# Patient Record
Sex: MALE | Race: WHITE | Employment: OTHER | URBAN - METROPOLITAN AREA
[De-identification: names, ages, dates, MRNs, and addresses within clinical notes are randomized per-mention and may not be internally consistent; named-entity substitution may affect disease eponyms.]

---

## 2017-01-11 ENCOUNTER — HOSPITAL ENCOUNTER (OUTPATIENT)
Dept: GENERAL RADIOLOGY | Age: 82
Discharge: HOME | End: 2017-01-11
Attending: INTERNAL MEDICINE | Admitting: INTERNAL MEDICINE

## 2017-01-11 DIAGNOSIS — I10 ESSENTIAL HYPERTENSION: ICD-10-CM

## 2017-01-11 DIAGNOSIS — E78.5 HYPERLIPIDEMIA, UNSPECIFIED HYPERLIPIDEMIA TYPE: ICD-10-CM

## 2017-01-11 LAB
A/G RATIO: 1.6 (ref 1.1–2.2)
ALBUMIN SERPL-MCNC: 4 G/DL (ref 3.4–5)
ALP BLD-CCNC: 43 U/L (ref 40–129)
ALT SERPL-CCNC: 17 U/L (ref 10–40)
ANION GAP SERPL CALCULATED.3IONS-SCNC: 13 MMOL/L (ref 3–16)
AST SERPL-CCNC: 16 U/L (ref 15–37)
BILIRUB SERPL-MCNC: 0.6 MG/DL (ref 0–1)
BUN BLDV-MCNC: 34 MG/DL (ref 7–20)
CALCIUM SERPL-MCNC: 9.2 MG/DL (ref 8.3–10.6)
CHLORIDE BLD-SCNC: 102 MMOL/L (ref 99–110)
CHOLESTEROL, TOTAL: 149 MG/DL (ref 0–199)
CO2: 29 MMOL/L (ref 21–32)
CREAT SERPL-MCNC: 1.4 MG/DL (ref 0.8–1.3)
GFR AFRICAN AMERICAN: 59
GFR NON-AFRICAN AMERICAN: 49
GLOBULIN: 2.5 G/DL
GLUCOSE BLD-MCNC: 89 MG/DL (ref 70–99)
HCT VFR BLD CALC: 42.7 % (ref 40.5–52.5)
HDLC SERPL-MCNC: 56 MG/DL (ref 40–60)
HEMATOLOGY PATH CONSULT: NO
HEMOGLOBIN: 12.5 G/DL (ref 13.5–17.5)
LDL CHOLESTEROL CALCULATED: 59 MG/DL
MCH RBC QN AUTO: 18.6 PG (ref 26–34)
MCHC RBC AUTO-ENTMCNC: 29.3 G/DL (ref 31–36)
MCV RBC AUTO: 63.4 FL (ref 80–100)
PDW BLD-RTO: 19.1 % (ref 12.4–15.4)
PLATELET # BLD: 353 K/UL (ref 135–450)
PMV BLD AUTO: 9.1 FL (ref 5–10.5)
POTASSIUM SERPL-SCNC: 4.5 MMOL/L (ref 3.5–5.1)
RBC # BLD: 6.74 M/UL (ref 4.2–5.9)
SODIUM BLD-SCNC: 144 MMOL/L (ref 136–145)
TOTAL PROTEIN: 6.5 G/DL (ref 6.4–8.2)
TRIGL SERPL-MCNC: 171 MG/DL (ref 0–150)
VLDLC SERPL CALC-MCNC: 34 MG/DL
WBC # BLD: 11.3 K/UL (ref 4–11)

## 2017-01-12 ENCOUNTER — OFFICE VISIT (OUTPATIENT)
Dept: INTERNAL MEDICINE CLINIC | Age: 82
End: 2017-01-12

## 2017-01-12 VITALS
OXYGEN SATURATION: 95 % | BODY MASS INDEX: 23.92 KG/M2 | WEIGHT: 162 LBS | DIASTOLIC BLOOD PRESSURE: 50 MMHG | SYSTOLIC BLOOD PRESSURE: 126 MMHG | HEART RATE: 93 BPM

## 2017-01-12 DIAGNOSIS — N18.30 CHRONIC RENAL INSUFFICIENCY, STAGE 3 (MODERATE) (HCC): ICD-10-CM

## 2017-01-12 DIAGNOSIS — E78.5 HYPERLIPIDEMIA, UNSPECIFIED HYPERLIPIDEMIA TYPE: ICD-10-CM

## 2017-01-12 DIAGNOSIS — I25.10 CAD IN NATIVE ARTERY: ICD-10-CM

## 2017-01-12 DIAGNOSIS — I27.20 PULMONARY HYPERTENSION (HCC): ICD-10-CM

## 2017-01-12 DIAGNOSIS — C34.11 MALIGNANT NEOPLASM OF UPPER LOBE OF RIGHT LUNG (HCC): ICD-10-CM

## 2017-01-12 DIAGNOSIS — I10 ESSENTIAL HYPERTENSION: Primary | ICD-10-CM

## 2017-01-12 DIAGNOSIS — J06.9 UPPER RESPIRATORY TRACT INFECTION, UNSPECIFIED TYPE: ICD-10-CM

## 2017-01-12 PROCEDURE — 4040F PNEUMOC VAC/ADMIN/RCVD: CPT | Performed by: INTERNAL MEDICINE

## 2017-01-12 PROCEDURE — 1036F TOBACCO NON-USER: CPT | Performed by: INTERNAL MEDICINE

## 2017-01-12 PROCEDURE — G8484 FLU IMMUNIZE NO ADMIN: HCPCS | Performed by: INTERNAL MEDICINE

## 2017-01-12 PROCEDURE — 1123F ACP DISCUSS/DSCN MKR DOCD: CPT | Performed by: INTERNAL MEDICINE

## 2017-01-12 PROCEDURE — G8420 CALC BMI NORM PARAMETERS: HCPCS | Performed by: INTERNAL MEDICINE

## 2017-01-12 PROCEDURE — 99214 OFFICE O/P EST MOD 30 MIN: CPT | Performed by: INTERNAL MEDICINE

## 2017-01-12 PROCEDURE — G8598 ASA/ANTIPLAT THER USED: HCPCS | Performed by: INTERNAL MEDICINE

## 2017-01-12 PROCEDURE — G8427 DOCREV CUR MEDS BY ELIG CLIN: HCPCS | Performed by: INTERNAL MEDICINE

## 2017-01-12 ASSESSMENT — PATIENT HEALTH QUESTIONNAIRE - PHQ9
1. LITTLE INTEREST OR PLEASURE IN DOING THINGS: 1
SUM OF ALL RESPONSES TO PHQ9 QUESTIONS 1 & 2: 2
SUM OF ALL RESPONSES TO PHQ9 QUESTIONS 1 & 2: 0
1. LITTLE INTEREST OR PLEASURE IN DOING THINGS: 0
SUM OF ALL RESPONSES TO PHQ QUESTIONS 1-9: 0
2. FEELING DOWN, DEPRESSED OR HOPELESS: 1
SUM OF ALL RESPONSES TO PHQ QUESTIONS 1-9: 2
2. FEELING DOWN, DEPRESSED OR HOPELESS: 0

## 2017-01-26 RX ORDER — ZOLPIDEM TARTRATE 10 MG/1
TABLET ORAL
Qty: 30 TABLET | Refills: 2 | Status: SHIPPED | OUTPATIENT
Start: 2017-01-26 | End: 2017-06-02 | Stop reason: SDUPTHER

## 2017-01-26 RX ORDER — SERTRALINE HYDROCHLORIDE 100 MG/1
TABLET, FILM COATED ORAL
Qty: 90 TABLET | OUTPATIENT
Start: 2017-01-26

## 2017-03-27 ENCOUNTER — TELEPHONE (OUTPATIENT)
Dept: INTERNAL MEDICINE CLINIC | Age: 82
End: 2017-03-27

## 2017-03-27 RX ORDER — SERTRALINE HYDROCHLORIDE 100 MG/1
100 TABLET, FILM COATED ORAL DAILY
Qty: 90 TABLET | Refills: 3 | Status: SHIPPED | OUTPATIENT
Start: 2017-03-27 | End: 2017-04-13 | Stop reason: SDUPTHER

## 2017-04-13 ENCOUNTER — TELEPHONE (OUTPATIENT)
Dept: INTERNAL MEDICINE CLINIC | Age: 82
End: 2017-04-13

## 2017-04-13 ENCOUNTER — OFFICE VISIT (OUTPATIENT)
Dept: INTERNAL MEDICINE CLINIC | Age: 82
End: 2017-04-13

## 2017-04-13 ENCOUNTER — HOSPITAL ENCOUNTER (OUTPATIENT)
Dept: VASCULAR LAB | Age: 82
Discharge: HOME | End: 2017-04-13
Attending: INTERNAL MEDICINE | Admitting: INTERNAL MEDICINE

## 2017-04-13 VITALS
WEIGHT: 161 LBS | HEIGHT: 70 IN | BODY MASS INDEX: 23.05 KG/M2 | SYSTOLIC BLOOD PRESSURE: 122 MMHG | DIASTOLIC BLOOD PRESSURE: 64 MMHG | OXYGEN SATURATION: 86 % | HEART RATE: 93 BPM

## 2017-04-13 DIAGNOSIS — I10 ESSENTIAL HYPERTENSION: ICD-10-CM

## 2017-04-13 DIAGNOSIS — M79.604 PAIN OF RIGHT LOWER EXTREMITY: Primary | ICD-10-CM

## 2017-04-13 DIAGNOSIS — F32.A DEPRESSION, UNSPECIFIED DEPRESSION TYPE: ICD-10-CM

## 2017-04-13 DIAGNOSIS — M79.604 PAIN OF RIGHT LEG: ICD-10-CM

## 2017-04-13 DIAGNOSIS — N18.30 CHRONIC RENAL INSUFFICIENCY, STAGE 3 (MODERATE) (HCC): ICD-10-CM

## 2017-04-13 DIAGNOSIS — I25.10 CORONARY ARTERY DISEASE INVOLVING NATIVE CORONARY ARTERY OF NATIVE HEART WITHOUT ANGINA PECTORIS: ICD-10-CM

## 2017-04-13 PROCEDURE — 1123F ACP DISCUSS/DSCN MKR DOCD: CPT | Performed by: INTERNAL MEDICINE

## 2017-04-13 PROCEDURE — G8420 CALC BMI NORM PARAMETERS: HCPCS | Performed by: INTERNAL MEDICINE

## 2017-04-13 PROCEDURE — 1036F TOBACCO NON-USER: CPT | Performed by: INTERNAL MEDICINE

## 2017-04-13 PROCEDURE — 4040F PNEUMOC VAC/ADMIN/RCVD: CPT | Performed by: INTERNAL MEDICINE

## 2017-04-13 PROCEDURE — G8427 DOCREV CUR MEDS BY ELIG CLIN: HCPCS | Performed by: INTERNAL MEDICINE

## 2017-04-13 PROCEDURE — G8598 ASA/ANTIPLAT THER USED: HCPCS | Performed by: INTERNAL MEDICINE

## 2017-04-13 PROCEDURE — 99214 OFFICE O/P EST MOD 30 MIN: CPT | Performed by: INTERNAL MEDICINE

## 2017-04-13 RX ORDER — SERTRALINE HYDROCHLORIDE 100 MG/1
150 TABLET, FILM COATED ORAL DAILY
Qty: 135 TABLET | Refills: 3 | Status: SHIPPED | OUTPATIENT
Start: 2017-04-13 | End: 2018-01-30 | Stop reason: SDUPTHER

## 2017-04-17 ENCOUNTER — TELEPHONE (OUTPATIENT)
Dept: INTERNAL MEDICINE CLINIC | Age: 82
End: 2017-04-17

## 2017-04-18 ENCOUNTER — TELEPHONE (OUTPATIENT)
Dept: INTERNAL MEDICINE CLINIC | Age: 82
End: 2017-04-18

## 2017-04-29 DIAGNOSIS — I82.431 ACUTE DEEP VEIN THROMBOSIS (DVT) OF POPLITEAL VEIN OF RIGHT LOWER EXTREMITY (HCC): ICD-10-CM

## 2017-04-29 PROBLEM — I82.409 DVT (DEEP VENOUS THROMBOSIS) (HCC): Status: ACTIVE | Noted: 2017-04-13

## 2017-05-01 ENCOUNTER — OFFICE VISIT (OUTPATIENT)
Dept: INTERNAL MEDICINE CLINIC | Age: 82
End: 2017-05-01

## 2017-05-01 VITALS
TEMPERATURE: 99 F | HEART RATE: 78 BPM | OXYGEN SATURATION: 86 % | SYSTOLIC BLOOD PRESSURE: 140 MMHG | DIASTOLIC BLOOD PRESSURE: 70 MMHG | BODY MASS INDEX: 23.27 KG/M2 | WEIGHT: 161 LBS

## 2017-05-01 DIAGNOSIS — J01.00 ACUTE NON-RECURRENT MAXILLARY SINUSITIS: Primary | ICD-10-CM

## 2017-05-01 DIAGNOSIS — R05.9 COUGH: ICD-10-CM

## 2017-05-01 PROCEDURE — G8598 ASA/ANTIPLAT THER USED: HCPCS | Performed by: NURSE PRACTITIONER

## 2017-05-01 PROCEDURE — 1123F ACP DISCUSS/DSCN MKR DOCD: CPT | Performed by: NURSE PRACTITIONER

## 2017-05-01 PROCEDURE — 4040F PNEUMOC VAC/ADMIN/RCVD: CPT | Performed by: NURSE PRACTITIONER

## 2017-05-01 PROCEDURE — G8420 CALC BMI NORM PARAMETERS: HCPCS | Performed by: NURSE PRACTITIONER

## 2017-05-01 PROCEDURE — 1036F TOBACCO NON-USER: CPT | Performed by: NURSE PRACTITIONER

## 2017-05-01 PROCEDURE — G8427 DOCREV CUR MEDS BY ELIG CLIN: HCPCS | Performed by: NURSE PRACTITIONER

## 2017-05-01 PROCEDURE — 99213 OFFICE O/P EST LOW 20 MIN: CPT | Performed by: NURSE PRACTITIONER

## 2017-05-01 RX ORDER — AZITHROMYCIN 250 MG/1
TABLET, FILM COATED ORAL
Qty: 1 PACKET | Refills: 0 | Status: SHIPPED | OUTPATIENT
Start: 2017-05-01 | End: 2017-08-14

## 2017-05-01 ASSESSMENT — ENCOUNTER SYMPTOMS
DIARRHEA: 0
SINUS PRESSURE: 1
COUGH: 1
FACIAL SWELLING: 0
NAUSEA: 0
SORE THROAT: 0
VOMITING: 0

## 2017-05-09 ENCOUNTER — TELEPHONE (OUTPATIENT)
Dept: INTERNAL MEDICINE CLINIC | Age: 82
End: 2017-05-09

## 2017-05-09 RX ORDER — BENZONATATE 100 MG/1
100 CAPSULE ORAL 3 TIMES DAILY PRN
Qty: 15 CAPSULE | Refills: 0 | Status: SHIPPED | OUTPATIENT
Start: 2017-05-09 | End: 2017-11-10 | Stop reason: ALTCHOICE

## 2017-05-09 RX ORDER — FLUTICASONE PROPIONATE 50 MCG
SPRAY, SUSPENSION (ML) NASAL
Qty: 1 BOTTLE | Refills: 1 | Status: SHIPPED | OUTPATIENT
Start: 2017-05-09

## 2017-05-11 ENCOUNTER — OFFICE VISIT (OUTPATIENT)
Dept: INTERNAL MEDICINE CLINIC | Age: 82
End: 2017-05-11

## 2017-05-11 VITALS
WEIGHT: 158 LBS | DIASTOLIC BLOOD PRESSURE: 60 MMHG | OXYGEN SATURATION: 90 % | HEART RATE: 90 BPM | SYSTOLIC BLOOD PRESSURE: 129 MMHG | BODY MASS INDEX: 22.62 KG/M2 | HEIGHT: 70 IN

## 2017-05-11 DIAGNOSIS — I10 ESSENTIAL HYPERTENSION: ICD-10-CM

## 2017-05-11 DIAGNOSIS — I25.10 CORONARY ARTERY DISEASE INVOLVING NATIVE CORONARY ARTERY OF NATIVE HEART WITHOUT ANGINA PECTORIS: ICD-10-CM

## 2017-05-11 DIAGNOSIS — I27.20 PULMONARY HYPERTENSION (HCC): ICD-10-CM

## 2017-05-11 DIAGNOSIS — I63.331 CEREBROVASCULAR ACCIDENT (CVA) DUE TO THROMBOSIS OF RIGHT POSTERIOR CEREBRAL ARTERY (HCC): ICD-10-CM

## 2017-05-11 DIAGNOSIS — I21.3 ST ELEVATION MYOCARDIAL INFARCTION (STEMI), UNSPECIFIED ARTERY (HCC): ICD-10-CM

## 2017-05-11 DIAGNOSIS — D64.9 ANEMIA, UNSPECIFIED TYPE: ICD-10-CM

## 2017-05-11 DIAGNOSIS — E78.5 HYPERLIPIDEMIA, UNSPECIFIED HYPERLIPIDEMIA TYPE: ICD-10-CM

## 2017-05-11 DIAGNOSIS — I82.431 ACUTE DEEP VEIN THROMBOSIS (DVT) OF POPLITEAL VEIN OF RIGHT LOWER EXTREMITY (HCC): Primary | ICD-10-CM

## 2017-05-11 PROCEDURE — 1123F ACP DISCUSS/DSCN MKR DOCD: CPT | Performed by: INTERNAL MEDICINE

## 2017-05-11 PROCEDURE — 1036F TOBACCO NON-USER: CPT | Performed by: INTERNAL MEDICINE

## 2017-05-11 PROCEDURE — 99214 OFFICE O/P EST MOD 30 MIN: CPT | Performed by: INTERNAL MEDICINE

## 2017-05-11 PROCEDURE — G8419 CALC BMI OUT NRM PARAM NOF/U: HCPCS | Performed by: INTERNAL MEDICINE

## 2017-05-11 PROCEDURE — G8427 DOCREV CUR MEDS BY ELIG CLIN: HCPCS | Performed by: INTERNAL MEDICINE

## 2017-05-11 PROCEDURE — 4040F PNEUMOC VAC/ADMIN/RCVD: CPT | Performed by: INTERNAL MEDICINE

## 2017-05-11 PROCEDURE — G8598 ASA/ANTIPLAT THER USED: HCPCS | Performed by: INTERNAL MEDICINE

## 2017-05-15 PROBLEM — I82.401 DEEP VEIN THROMBOSIS OF RIGHT LOWER LIMB (HCC): Status: ACTIVE | Noted: 2017-05-15

## 2017-06-02 RX ORDER — ZOLPIDEM TARTRATE 10 MG/1
TABLET ORAL
Qty: 30 TABLET | Refills: 2 | Status: SHIPPED | OUTPATIENT
Start: 2017-06-02 | End: 2018-01-16 | Stop reason: SDUPTHER

## 2017-06-08 ENCOUNTER — TELEPHONE (OUTPATIENT)
Dept: INTERNAL MEDICINE CLINIC | Age: 82
End: 2017-06-08

## 2017-08-17 ENCOUNTER — OFFICE VISIT (OUTPATIENT)
Dept: INTERNAL MEDICINE CLINIC | Age: 82
End: 2017-08-17

## 2017-08-17 VITALS
HEIGHT: 70 IN | BODY MASS INDEX: 22.9 KG/M2 | SYSTOLIC BLOOD PRESSURE: 120 MMHG | DIASTOLIC BLOOD PRESSURE: 54 MMHG | WEIGHT: 160 LBS | OXYGEN SATURATION: 91 % | HEART RATE: 82 BPM

## 2017-08-17 DIAGNOSIS — E78.5 HYPERLIPIDEMIA, UNSPECIFIED HYPERLIPIDEMIA TYPE: ICD-10-CM

## 2017-08-17 DIAGNOSIS — I63.331 CEREBROVASCULAR ACCIDENT (CVA) DUE TO THROMBOSIS OF RIGHT POSTERIOR CEREBRAL ARTERY (HCC): ICD-10-CM

## 2017-08-17 DIAGNOSIS — I10 ESSENTIAL HYPERTENSION: ICD-10-CM

## 2017-08-17 DIAGNOSIS — I25.10 CAD IN NATIVE ARTERY: ICD-10-CM

## 2017-08-17 DIAGNOSIS — L03.115 CELLULITIS OF RIGHT LOWER EXTREMITY: Primary | ICD-10-CM

## 2017-08-17 PROCEDURE — 4040F PNEUMOC VAC/ADMIN/RCVD: CPT | Performed by: INTERNAL MEDICINE

## 2017-08-17 PROCEDURE — G8598 ASA/ANTIPLAT THER USED: HCPCS | Performed by: INTERNAL MEDICINE

## 2017-08-17 PROCEDURE — G8427 DOCREV CUR MEDS BY ELIG CLIN: HCPCS | Performed by: INTERNAL MEDICINE

## 2017-08-17 PROCEDURE — 1036F TOBACCO NON-USER: CPT | Performed by: INTERNAL MEDICINE

## 2017-08-17 PROCEDURE — 99214 OFFICE O/P EST MOD 30 MIN: CPT | Performed by: INTERNAL MEDICINE

## 2017-08-17 PROCEDURE — G8420 CALC BMI NORM PARAMETERS: HCPCS | Performed by: INTERNAL MEDICINE

## 2017-08-17 PROCEDURE — 1123F ACP DISCUSS/DSCN MKR DOCD: CPT | Performed by: INTERNAL MEDICINE

## 2017-09-05 ENCOUNTER — TELEPHONE (OUTPATIENT)
Dept: INTERNAL MEDICINE CLINIC | Age: 82
End: 2017-09-05

## 2017-09-05 RX ORDER — CLINDAMYCIN HYDROCHLORIDE 300 MG/1
300 CAPSULE ORAL 3 TIMES DAILY
Qty: 30 CAPSULE | Refills: 0 | Status: SHIPPED | OUTPATIENT
Start: 2017-09-05 | End: 2017-09-15

## 2017-09-08 ENCOUNTER — OFFICE VISIT (OUTPATIENT)
Dept: INTERNAL MEDICINE CLINIC | Age: 82
End: 2017-09-08

## 2017-09-08 VITALS
BODY MASS INDEX: 22.76 KG/M2 | WEIGHT: 159 LBS | HEART RATE: 79 BPM | HEIGHT: 70 IN | DIASTOLIC BLOOD PRESSURE: 58 MMHG | OXYGEN SATURATION: 94 % | SYSTOLIC BLOOD PRESSURE: 114 MMHG

## 2017-09-08 DIAGNOSIS — Z01.818 PRE-OP EXAMINATION: Primary | ICD-10-CM

## 2017-09-08 DIAGNOSIS — M48.00 SPINAL STENOSIS, UNSPECIFIED SPINAL REGION: ICD-10-CM

## 2017-09-08 DIAGNOSIS — I63.331 CEREBROVASCULAR ACCIDENT (CVA) DUE TO THROMBOSIS OF RIGHT POSTERIOR CEREBRAL ARTERY (HCC): ICD-10-CM

## 2017-09-08 DIAGNOSIS — C34.11 MALIGNANT NEOPLASM OF UPPER LOBE OF RIGHT LUNG (HCC): ICD-10-CM

## 2017-09-08 DIAGNOSIS — E78.5 HYPERLIPIDEMIA, UNSPECIFIED HYPERLIPIDEMIA TYPE: ICD-10-CM

## 2017-09-08 DIAGNOSIS — I21.3 ST ELEVATION MYOCARDIAL INFARCTION (STEMI), UNSPECIFIED ARTERY (HCC): ICD-10-CM

## 2017-09-08 DIAGNOSIS — I10 ESSENTIAL HYPERTENSION: ICD-10-CM

## 2017-09-08 DIAGNOSIS — I27.20 PULMONARY HYPERTENSION (HCC): ICD-10-CM

## 2017-09-08 PROCEDURE — 1036F TOBACCO NON-USER: CPT | Performed by: INTERNAL MEDICINE

## 2017-09-08 PROCEDURE — 93000 ELECTROCARDIOGRAM COMPLETE: CPT | Performed by: INTERNAL MEDICINE

## 2017-09-08 PROCEDURE — 99215 OFFICE O/P EST HI 40 MIN: CPT | Performed by: INTERNAL MEDICINE

## 2017-09-08 PROCEDURE — 1123F ACP DISCUSS/DSCN MKR DOCD: CPT | Performed by: INTERNAL MEDICINE

## 2017-09-08 PROCEDURE — 4040F PNEUMOC VAC/ADMIN/RCVD: CPT | Performed by: INTERNAL MEDICINE

## 2017-09-08 PROCEDURE — G8598 ASA/ANTIPLAT THER USED: HCPCS | Performed by: INTERNAL MEDICINE

## 2017-09-08 PROCEDURE — G8420 CALC BMI NORM PARAMETERS: HCPCS | Performed by: INTERNAL MEDICINE

## 2017-09-08 PROCEDURE — G8427 DOCREV CUR MEDS BY ELIG CLIN: HCPCS | Performed by: INTERNAL MEDICINE

## 2017-09-19 ENCOUNTER — HOSPITAL ENCOUNTER (OUTPATIENT)
Dept: SURGERY | Age: 82
Discharge: HOME | End: 2017-09-19
Attending: OPHTHALMOLOGY | Admitting: OPHTHALMOLOGY

## 2017-09-19 VITALS
DIASTOLIC BLOOD PRESSURE: 65 MMHG | TEMPERATURE: 97.7 F | HEIGHT: 70 IN | OXYGEN SATURATION: 97 % | RESPIRATION RATE: 13 BRPM | HEART RATE: 66 BPM | BODY MASS INDEX: 22.76 KG/M2 | SYSTOLIC BLOOD PRESSURE: 106 MMHG | WEIGHT: 159 LBS

## 2017-09-19 RX ORDER — PROMETHAZINE HYDROCHLORIDE 25 MG/ML
6.25 INJECTION, SOLUTION INTRAMUSCULAR; INTRAVENOUS
Status: DISCONTINUED | OUTPATIENT
Start: 2017-09-19 | End: 2017-09-20 | Stop reason: HOSPADM

## 2017-09-19 RX ORDER — DIPHENHYDRAMINE HCL 50 MG
50 CAPSULE ORAL EVERY 6 HOURS PRN
Status: ON HOLD | COMMUNITY
End: 2018-01-01 | Stop reason: HOSPADM

## 2017-09-19 RX ORDER — MORPHINE SULFATE 2 MG/ML
1 INJECTION, SOLUTION INTRAMUSCULAR; INTRAVENOUS EVERY 5 MIN PRN
Status: DISCONTINUED | OUTPATIENT
Start: 2017-09-19 | End: 2017-09-20 | Stop reason: HOSPADM

## 2017-09-19 RX ORDER — LIDOCAINE HYDROCHLORIDE 10 MG/ML
1 INJECTION, SOLUTION EPIDURAL; INFILTRATION; INTRACAUDAL; PERINEURAL
Status: ACTIVE | OUTPATIENT
Start: 2017-09-19 | End: 2017-09-19

## 2017-09-19 RX ORDER — DIPHENHYDRAMINE HYDROCHLORIDE 50 MG/ML
12.5 INJECTION INTRAMUSCULAR; INTRAVENOUS
Status: ACTIVE | OUTPATIENT
Start: 2017-09-19 | End: 2017-09-19

## 2017-09-19 RX ORDER — OXYCODONE HYDROCHLORIDE AND ACETAMINOPHEN 5; 325 MG/1; MG/1
1 TABLET ORAL PRN
Status: ACTIVE | OUTPATIENT
Start: 2017-09-19 | End: 2017-09-19

## 2017-09-19 RX ORDER — OXYCODONE HYDROCHLORIDE AND ACETAMINOPHEN 5; 325 MG/1; MG/1
2 TABLET ORAL PRN
Status: ACTIVE | OUTPATIENT
Start: 2017-09-19 | End: 2017-09-19

## 2017-09-19 RX ORDER — SODIUM CHLORIDE, SODIUM LACTATE, POTASSIUM CHLORIDE, CALCIUM CHLORIDE 600; 310; 30; 20 MG/100ML; MG/100ML; MG/100ML; MG/100ML
INJECTION, SOLUTION INTRAVENOUS CONTINUOUS
Status: DISCONTINUED | OUTPATIENT
Start: 2017-09-19 | End: 2017-09-20 | Stop reason: HOSPADM

## 2017-09-19 RX ORDER — LABETALOL HYDROCHLORIDE 5 MG/ML
5 INJECTION, SOLUTION INTRAVENOUS EVERY 10 MIN PRN
Status: DISCONTINUED | OUTPATIENT
Start: 2017-09-19 | End: 2017-09-20 | Stop reason: HOSPADM

## 2017-09-19 RX ORDER — MORPHINE SULFATE 2 MG/ML
2 INJECTION, SOLUTION INTRAMUSCULAR; INTRAVENOUS EVERY 5 MIN PRN
Status: DISCONTINUED | OUTPATIENT
Start: 2017-09-19 | End: 2017-09-20 | Stop reason: HOSPADM

## 2017-09-19 RX ORDER — MEPERIDINE HYDROCHLORIDE 50 MG/ML
12.5 INJECTION INTRAMUSCULAR; INTRAVENOUS; SUBCUTANEOUS EVERY 5 MIN PRN
Status: DISCONTINUED | OUTPATIENT
Start: 2017-09-19 | End: 2017-09-20 | Stop reason: HOSPADM

## 2017-09-19 RX ORDER — HYDRALAZINE HYDROCHLORIDE 20 MG/ML
5 INJECTION INTRAMUSCULAR; INTRAVENOUS EVERY 10 MIN PRN
Status: DISCONTINUED | OUTPATIENT
Start: 2017-09-19 | End: 2017-09-20 | Stop reason: HOSPADM

## 2017-09-19 RX ORDER — ONDANSETRON 2 MG/ML
4 INJECTION INTRAMUSCULAR; INTRAVENOUS PRN
Status: DISCONTINUED | OUTPATIENT
Start: 2017-09-19 | End: 2017-09-20 | Stop reason: HOSPADM

## 2017-09-19 RX ADMIN — SODIUM CHLORIDE, SODIUM LACTATE, POTASSIUM CHLORIDE, CALCIUM CHLORIDE: 600; 310; 30; 20 INJECTION, SOLUTION INTRAVENOUS at 10:31

## 2017-09-19 ASSESSMENT — PAIN SCALES - GENERAL
PAINLEVEL_OUTOF10: 0

## 2017-09-19 ASSESSMENT — PAIN - FUNCTIONAL ASSESSMENT: PAIN_FUNCTIONAL_ASSESSMENT: 0-10

## 2017-10-30 ENCOUNTER — TELEPHONE (OUTPATIENT)
Dept: INTERNAL MEDICINE CLINIC | Age: 82
End: 2017-10-30

## 2017-10-30 NOTE — TELEPHONE ENCOUNTER
Chart reviewed. Lab order from [phone] in Epic for CBC, CMP, lipid profile. This is still \"active\".   We'll review immunizations at office visit  Dr. goddard

## 2017-10-30 NOTE — TELEPHONE ENCOUNTER
Patient has appt 11/10 and would like to get blood work ordered before he comes in. Please place order and call him back at [phone]    Also, he would like to get a pneumonia and flu shot at that appointment.

## 2017-11-01 ENCOUNTER — HOSPITAL ENCOUNTER (OUTPATIENT)
Dept: OTHER | Age: 82
Discharge: HOME | End: 2017-11-30
Attending: INTERNAL MEDICINE | Admitting: INTERNAL MEDICINE

## 2017-11-06 ENCOUNTER — HOSPITAL ENCOUNTER (OUTPATIENT)
Dept: GENERAL RADIOLOGY | Age: 82
Discharge: HOME | End: 2017-11-06
Attending: INTERNAL MEDICINE | Admitting: INTERNAL MEDICINE

## 2017-11-06 DIAGNOSIS — E78.5 HYPERLIPIDEMIA, UNSPECIFIED HYPERLIPIDEMIA TYPE: ICD-10-CM

## 2017-11-06 DIAGNOSIS — D64.9 ANEMIA, UNSPECIFIED TYPE: ICD-10-CM

## 2017-11-06 DIAGNOSIS — I10 ESSENTIAL HYPERTENSION: ICD-10-CM

## 2017-11-06 LAB
A/G RATIO: 1.8 (ref 1.1–2.2)
ALBUMIN SERPL-MCNC: 4.2 G/DL (ref 3.4–5)
ALP BLD-CCNC: 42 U/L (ref 40–129)
ALT SERPL-CCNC: 11 U/L (ref 10–40)
ANION GAP SERPL CALCULATED.3IONS-SCNC: 14 MMOL/L (ref 3–16)
AST SERPL-CCNC: 14 U/L (ref 15–37)
BILIRUB SERPL-MCNC: 0.5 MG/DL (ref 0–1)
BUN BLDV-MCNC: 35 MG/DL (ref 7–20)
CALCIUM SERPL-MCNC: 9.1 MG/DL (ref 8.3–10.6)
CHLORIDE BLD-SCNC: 100 MMOL/L (ref 99–110)
CHOLESTEROL, TOTAL: 138 MG/DL (ref 0–199)
CO2: 30 MMOL/L (ref 21–32)
CREAT SERPL-MCNC: 1.4 MG/DL (ref 0.8–1.3)
GFR AFRICAN AMERICAN: 59
GFR NON-AFRICAN AMERICAN: 49
GLOBULIN: 2.4 G/DL
GLUCOSE BLD-MCNC: 83 MG/DL (ref 70–99)
HCT VFR BLD CALC: 38.9 % (ref 40.5–52.5)
HDLC SERPL-MCNC: 54 MG/DL (ref 40–60)
HEMATOLOGY PATH CONSULT: NO
HEMOGLOBIN: 11.6 G/DL (ref 13.5–17.5)
LDL CHOLESTEROL CALCULATED: 58 MG/DL
MCH RBC QN AUTO: 18.6 PG (ref 26–34)
MCHC RBC AUTO-ENTMCNC: 29.9 G/DL (ref 31–36)
MCV RBC AUTO: 62.2 FL (ref 80–100)
PDW BLD-RTO: 18.3 % (ref 12.4–15.4)
PLATELET # BLD: 385 K/UL (ref 135–450)
PLATELET SLIDE REVIEW: ADEQUATE
PMV BLD AUTO: 9.4 FL (ref 5–10.5)
POTASSIUM SERPL-SCNC: 4.2 MMOL/L (ref 3.5–5.1)
RBC # BLD: 6.25 M/UL (ref 4.2–5.9)
SLIDE REVIEW: ABNORMAL
SODIUM BLD-SCNC: 144 MMOL/L (ref 136–145)
TOTAL PROTEIN: 6.6 G/DL (ref 6.4–8.2)
TRIGL SERPL-MCNC: 128 MG/DL (ref 0–150)
VLDLC SERPL CALC-MCNC: 26 MG/DL
WBC # BLD: 10.2 K/UL (ref 4–11)

## 2017-11-10 ENCOUNTER — OFFICE VISIT (OUTPATIENT)
Dept: INTERNAL MEDICINE CLINIC | Age: 82
End: 2017-11-10

## 2017-11-10 VITALS
HEIGHT: 70 IN | SYSTOLIC BLOOD PRESSURE: 124 MMHG | DIASTOLIC BLOOD PRESSURE: 60 MMHG | BODY MASS INDEX: 22.76 KG/M2 | WEIGHT: 159 LBS

## 2017-11-10 DIAGNOSIS — E78.5 HYPERLIPIDEMIA, UNSPECIFIED HYPERLIPIDEMIA TYPE: ICD-10-CM

## 2017-11-10 DIAGNOSIS — R29.898 WEAKNESS OF BOTH LOWER EXTREMITIES: ICD-10-CM

## 2017-11-10 DIAGNOSIS — D64.9 ANEMIA, UNSPECIFIED TYPE: ICD-10-CM

## 2017-11-10 DIAGNOSIS — I63.331 CEREBROVASCULAR ACCIDENT (CVA) DUE TO THROMBOSIS OF RIGHT POSTERIOR CEREBRAL ARTERY (HCC): ICD-10-CM

## 2017-11-10 DIAGNOSIS — Z23 NEED FOR IMMUNIZATION AGAINST INFLUENZA: ICD-10-CM

## 2017-11-10 DIAGNOSIS — M48.00 SPINAL STENOSIS, UNSPECIFIED SPINAL REGION: ICD-10-CM

## 2017-11-10 DIAGNOSIS — I27.20 PULMONARY HYPERTENSION (HCC): ICD-10-CM

## 2017-11-10 DIAGNOSIS — I10 ESSENTIAL HYPERTENSION: Primary | ICD-10-CM

## 2017-11-10 DIAGNOSIS — I21.3 ST ELEVATION MYOCARDIAL INFARCTION (STEMI), UNSPECIFIED ARTERY (HCC): ICD-10-CM

## 2017-11-10 DIAGNOSIS — I82.431 ACUTE DEEP VEIN THROMBOSIS (DVT) OF POPLITEAL VEIN OF RIGHT LOWER EXTREMITY (HCC): ICD-10-CM

## 2017-11-10 PROCEDURE — 99214 OFFICE O/P EST MOD 30 MIN: CPT | Performed by: INTERNAL MEDICINE

## 2017-11-10 PROCEDURE — 90662 IIV NO PRSV INCREASED AG IM: CPT | Performed by: INTERNAL MEDICINE

## 2017-11-10 PROCEDURE — 1036F TOBACCO NON-USER: CPT | Performed by: INTERNAL MEDICINE

## 2017-11-10 PROCEDURE — G8598 ASA/ANTIPLAT THER USED: HCPCS | Performed by: INTERNAL MEDICINE

## 2017-11-10 PROCEDURE — 1123F ACP DISCUSS/DSCN MKR DOCD: CPT | Performed by: INTERNAL MEDICINE

## 2017-11-10 PROCEDURE — G8484 FLU IMMUNIZE NO ADMIN: HCPCS | Performed by: INTERNAL MEDICINE

## 2017-11-10 PROCEDURE — G8420 CALC BMI NORM PARAMETERS: HCPCS | Performed by: INTERNAL MEDICINE

## 2017-11-10 PROCEDURE — G0008 ADMIN INFLUENZA VIRUS VAC: HCPCS | Performed by: INTERNAL MEDICINE

## 2017-11-10 PROCEDURE — G8427 DOCREV CUR MEDS BY ELIG CLIN: HCPCS | Performed by: INTERNAL MEDICINE

## 2017-11-10 PROCEDURE — 4040F PNEUMOC VAC/ADMIN/RCVD: CPT | Performed by: INTERNAL MEDICINE

## 2017-11-10 NOTE — PROGRESS NOTES
Vaccine Information Sheet, \"Influenza - Inactivated\"  given to [employer], or parent/legal guardian of  [employer] and verbalized understanding. Patient responses:    Have you ever had a reaction to a flu vaccine? No  Are you able to eat eggs without adverse effects? Yes  Do you have any current illness? No  Have you ever had Guillian [place] Syndrome? No    Flu vaccine given per order. Please see immunization tab.

## 2017-11-10 NOTE — PROGRESS NOTES
Subjective:      Patient ID: Abdullahi Izquierdo is a 81 y.o. male. CC: HTN  HPI: Patient here with portable oxygen also using cane to ambulate. Patient here with his wife. Patient notes leg weakness. History of spinal stenosis. Upcoming MRI scan lumbar spine. 9/11/2017: Left eye cataract surgery he states went well . History of HTN, hyperlipidemia, anxiety, prostate cancer, gout, GERD, CKD, spinal stenosis, myocardial infarction, pulmonary hypertension, DVT. Medicines and allergies reviewed  Health maintenance reviewed  Reviewed laboratory data 11/6/2017: Chemistry panel: BUN: 35.  Creatinine: 1.4. Lipid panel: Total cholesterol: 138. LDL cholesterol: 58. CBC: Hemoglobin 11.6. Hematocrit 38.9. Patient not taking iron supplement. This compares to laboratory data H/14/2017: BUN: 35.  Creatinine: 1.5. Review of Systems    Review of Systems   Constitutional: negative   HENT: negative   EYES: negative   Respiratory: negative   Gastrointestinal: negative   Endocrine: negative   Musculoskeletal: negative   Skin: negative   Allergic/Immunological: negative   Hematological: negative   Psychiatric/Behavorial: negative   CV: negative   CNS: negative   :Negative   S/E:Negative  Renal: Negative      Objective:   Physical Exam : Lungs: distant breath sounds but no wheezing. CV: S1-S2 normal.  SOSA. Carotid: No bruit. Head/neck: Neck: No lymphadenopathy. Thyroid: Not palpable. Eyes: EOMI, PERRLA without nystagnus. Spine/extremities: +1 ankle edema bilaterally. Skin: No rash. CNS:Patient's alert, cooperative, moves all 4 limbs, ambulates   With cane, no slurred speech, no facial droop, good orientation. Good historian. Blood pressure 124/60, height 5' 9.75\" (1.772 m), weight 159 lb (72.1 kg). Assessment:    1.  CAD: Stable  2. Leg weakness: Spinal stenosis? 3. Anemia although has improved  4. HTN: Stable  5. Lipids: Stable  6. CKD: Baseline  7. Pulmonary hypertension      Plan:     1.

## 2017-11-15 ENCOUNTER — HOSPITAL ENCOUNTER (OUTPATIENT)
Dept: GENERAL RADIOLOGY | Age: 82
Discharge: HOME | End: 2017-11-15
Attending: INTERNAL MEDICINE | Admitting: INTERNAL MEDICINE

## 2017-11-15 DIAGNOSIS — R29.898 WEAKNESS OF BOTH LOWER EXTREMITIES: ICD-10-CM

## 2017-11-15 LAB
FOLATE: >20 NG/ML (ref 4.78–24.2)
VITAMIN B-12: 928 PG/ML (ref 211–911)

## 2017-12-01 ENCOUNTER — HOSPITAL ENCOUNTER (OUTPATIENT)
Dept: OTHER | Age: 82
Discharge: HOME | End: 2017-12-31
Attending: INTERNAL MEDICINE | Admitting: INTERNAL MEDICINE

## 2017-12-26 ENCOUNTER — TELEPHONE (OUTPATIENT)
Dept: INTERNAL MEDICINE CLINIC | Age: 82
End: 2017-12-26

## 2018-01-01 ENCOUNTER — CARE COORDINATION (OUTPATIENT)
Dept: CASE MANAGEMENT | Age: 83
End: 2018-01-01

## 2018-01-01 ENCOUNTER — TELEPHONE (OUTPATIENT)
Dept: INTERNAL MEDICINE CLINIC | Age: 83
End: 2018-01-01

## 2018-01-01 ENCOUNTER — HOSPITAL ENCOUNTER (OUTPATIENT)
Dept: OTHER | Age: 83
Discharge: HOME | End: 2018-07-01
Attending: INTERNAL MEDICINE | Admitting: INTERNAL MEDICINE

## 2018-01-01 ENCOUNTER — HOSPITAL ENCOUNTER (OUTPATIENT)
Dept: MRI IMAGING | Age: 83
Discharge: HOME | End: 2018-11-06
Payer: MEDICARE

## 2018-01-01 ENCOUNTER — NURSE ONLY (OUTPATIENT)
Dept: INTERNAL MEDICINE CLINIC | Age: 83
End: 2018-01-01
Payer: MEDICARE

## 2018-01-01 ENCOUNTER — APPOINTMENT (OUTPATIENT)
Dept: GENERAL RADIOLOGY | Age: 83
End: 2018-01-01
Payer: MEDICARE

## 2018-01-01 ENCOUNTER — OFFICE VISIT (OUTPATIENT)
Dept: ORTHOPEDIC SURGERY | Age: 83
End: 2018-01-01
Payer: MEDICARE

## 2018-01-01 ENCOUNTER — HOSPITAL ENCOUNTER (OUTPATIENT)
Age: 83
Discharge: HOME | End: 2018-10-09

## 2018-01-01 ENCOUNTER — OFFICE VISIT (OUTPATIENT)
Dept: NEUROLOGY | Age: 83
End: 2018-01-01
Payer: MEDICARE

## 2018-01-01 ENCOUNTER — HOSPITAL ENCOUNTER (OUTPATIENT)
Dept: OTHER | Age: 83
Discharge: HOME | End: 2018-05-31
Attending: INTERNAL MEDICINE | Admitting: INTERNAL MEDICINE

## 2018-01-01 ENCOUNTER — HOSPITAL ENCOUNTER (OUTPATIENT)
Age: 83
Discharge: HOME | End: 2018-09-06

## 2018-01-01 ENCOUNTER — APPOINTMENT (OUTPATIENT)
Dept: GENERAL RADIOLOGY | Age: 83
DRG: 189 | End: 2018-01-01
Payer: MEDICARE

## 2018-01-01 ENCOUNTER — HOSPITAL ENCOUNTER (OUTPATIENT)
Dept: GENERAL RADIOLOGY | Age: 83
Discharge: HOME | End: 2018-05-09
Attending: INTERNAL MEDICINE | Admitting: INTERNAL MEDICINE

## 2018-01-01 ENCOUNTER — HOSPITAL ENCOUNTER (OUTPATIENT)
Dept: OTHER | Age: 83
Discharge: HOME | End: 2018-06-30
Attending: INTERNAL MEDICINE | Admitting: INTERNAL MEDICINE

## 2018-01-01 ENCOUNTER — ORTHOTIC/BRACE ENCOUNTER (OUTPATIENT)
Dept: ORTHOPEDIC SURGERY | Age: 83
End: 2018-01-01
Payer: MEDICARE

## 2018-01-01 ENCOUNTER — HOSPITAL ENCOUNTER (INPATIENT)
Age: 83
LOS: 2 days | Discharge: HOME | DRG: 189 | End: 2018-07-20
Attending: EMERGENCY MEDICINE | Admitting: INTERNAL MEDICINE
Payer: MEDICARE

## 2018-01-01 ENCOUNTER — OFFICE VISIT (OUTPATIENT)
Dept: INTERNAL MEDICINE CLINIC | Age: 83
End: 2018-01-01
Payer: MEDICARE

## 2018-01-01 ENCOUNTER — HOSPITAL ENCOUNTER (OUTPATIENT)
Age: 83
Discharge: HOME | End: 2018-08-14

## 2018-01-01 ENCOUNTER — HOSPITAL ENCOUNTER (OUTPATIENT)
Age: 83
Discharge: HOME | End: 2018-10-16
Payer: MEDICARE

## 2018-01-01 ENCOUNTER — APPOINTMENT (OUTPATIENT)
Dept: MRI IMAGING | Age: 83
DRG: 189 | End: 2018-01-01
Payer: MEDICARE

## 2018-01-01 ENCOUNTER — TELEPHONE (OUTPATIENT)
Dept: ORTHOPEDIC SURGERY | Age: 83
End: 2018-01-01

## 2018-01-01 ENCOUNTER — HOSPITAL ENCOUNTER (EMERGENCY)
Age: 83
Discharge: HOME | End: 2018-11-29
Attending: EMERGENCY MEDICINE
Payer: MEDICARE

## 2018-01-01 ENCOUNTER — HOSPITAL ENCOUNTER (OUTPATIENT)
Dept: MRI IMAGING | Age: 83
Discharge: HOME | DRG: 189 | End: 2018-07-16
Payer: MEDICARE

## 2018-01-01 ENCOUNTER — HOSPITAL ENCOUNTER (OUTPATIENT)
Age: 83
Discharge: HOME | End: 2018-11-06
Payer: MEDICARE

## 2018-01-01 ENCOUNTER — HOSPITAL ENCOUNTER (OUTPATIENT)
Age: 83
Discharge: HOME | End: 2018-11-07

## 2018-01-01 ENCOUNTER — HOSPITAL ENCOUNTER (OUTPATIENT)
Age: 83
Discharge: HOME | End: 2018-12-11

## 2018-01-01 ENCOUNTER — OFFICE VISIT (OUTPATIENT)
Dept: ORTHOPEDIC SURGERY | Age: 83
End: 2018-01-01

## 2018-01-01 ENCOUNTER — HOSPITAL ENCOUNTER (OUTPATIENT)
Age: 83
Discharge: HOME | DRG: 189 | End: 2018-07-16
Payer: MEDICARE

## 2018-01-01 ENCOUNTER — HOSPITAL ENCOUNTER (OUTPATIENT)
Dept: OTHER | Age: 83
Discharge: HOME | End: 2018-01-31
Attending: INTERNAL MEDICINE | Admitting: INTERNAL MEDICINE

## 2018-01-01 ENCOUNTER — OFFICE VISIT (OUTPATIENT)
Dept: INTERNAL MEDICINE CLINIC | Age: 83
End: 2018-01-01

## 2018-01-01 ENCOUNTER — ORTHOTIC/BRACE ENCOUNTER (OUTPATIENT)
Dept: ORTHOPEDIC SURGERY | Age: 83
End: 2018-01-01

## 2018-01-01 VITALS
TEMPERATURE: 98.2 F | RESPIRATION RATE: 20 BRPM | DIASTOLIC BLOOD PRESSURE: 65 MMHG | HEIGHT: 69 IN | SYSTOLIC BLOOD PRESSURE: 120 MMHG | OXYGEN SATURATION: 90 % | BODY MASS INDEX: 22.47 KG/M2 | WEIGHT: 151.68 LBS | HEART RATE: 91 BPM

## 2018-01-01 VITALS
DIASTOLIC BLOOD PRESSURE: 66 MMHG | OXYGEN SATURATION: 97 % | HEART RATE: 74 BPM | BODY MASS INDEX: 22.22 KG/M2 | RESPIRATION RATE: 19 BRPM | TEMPERATURE: 98.1 F | SYSTOLIC BLOOD PRESSURE: 123 MMHG | HEIGHT: 69 IN | WEIGHT: 150 LBS

## 2018-01-01 VITALS
WEIGHT: 161 LBS | DIASTOLIC BLOOD PRESSURE: 66 MMHG | BODY MASS INDEX: 23.85 KG/M2 | HEART RATE: 96 BPM | HEIGHT: 69 IN | SYSTOLIC BLOOD PRESSURE: 136 MMHG

## 2018-01-01 VITALS
DIASTOLIC BLOOD PRESSURE: 62 MMHG | WEIGHT: 145 LBS | HEIGHT: 70 IN | SYSTOLIC BLOOD PRESSURE: 126 MMHG | BODY MASS INDEX: 20.76 KG/M2 | HEART RATE: 98 BPM

## 2018-01-01 VITALS
SYSTOLIC BLOOD PRESSURE: 126 MMHG | DIASTOLIC BLOOD PRESSURE: 52 MMHG | BODY MASS INDEX: 21.38 KG/M2 | OXYGEN SATURATION: 82 % | HEART RATE: 102 BPM | WEIGHT: 149 LBS

## 2018-01-01 VITALS
DIASTOLIC BLOOD PRESSURE: 80 MMHG | HEART RATE: 109 BPM | SYSTOLIC BLOOD PRESSURE: 119 MMHG | BODY MASS INDEX: 20.76 KG/M2 | WEIGHT: 145 LBS | HEIGHT: 70 IN

## 2018-01-01 VITALS
BODY MASS INDEX: 21.67 KG/M2 | WEIGHT: 150 LBS | OXYGEN SATURATION: 90 % | SYSTOLIC BLOOD PRESSURE: 102 MMHG | HEART RATE: 92 BPM | DIASTOLIC BLOOD PRESSURE: 50 MMHG

## 2018-01-01 DIAGNOSIS — I10 ESSENTIAL HYPERTENSION: Primary | ICD-10-CM

## 2018-01-01 DIAGNOSIS — R31.9 URINARY TRACT INFECTION WITH HEMATURIA, SITE UNSPECIFIED: Primary | ICD-10-CM

## 2018-01-01 DIAGNOSIS — R30.0 DYSURIA: ICD-10-CM

## 2018-01-01 DIAGNOSIS — M51.36 DDD (DEGENERATIVE DISC DISEASE), LUMBAR: Primary | ICD-10-CM

## 2018-01-01 DIAGNOSIS — I10 ESSENTIAL HYPERTENSION: ICD-10-CM

## 2018-01-01 DIAGNOSIS — E78.5 HYPERLIPIDEMIA, UNSPECIFIED HYPERLIPIDEMIA TYPE: ICD-10-CM

## 2018-01-01 DIAGNOSIS — D64.9 ANEMIA, UNSPECIFIED TYPE: ICD-10-CM

## 2018-01-01 DIAGNOSIS — I25.10 CORONARY ARTERY DISEASE INVOLVING NATIVE CORONARY ARTERY OF NATIVE HEART WITHOUT ANGINA PECTORIS: ICD-10-CM

## 2018-01-01 DIAGNOSIS — G62.9 POLYNEUROPATHY: ICD-10-CM

## 2018-01-01 DIAGNOSIS — I27.20 PULMONARY HYPERTENSION (HCC): ICD-10-CM

## 2018-01-01 DIAGNOSIS — G62.9 POLYNEUROPATHY: Primary | ICD-10-CM

## 2018-01-01 DIAGNOSIS — M48.00 SPINAL STENOSIS, UNSPECIFIED SPINAL REGION: ICD-10-CM

## 2018-01-01 DIAGNOSIS — Z71.1 FEARED CONDITION NOT DEMONSTRATED: Primary | ICD-10-CM

## 2018-01-01 DIAGNOSIS — E11.42 TYPE 2 DIABETES MELLITUS WITH DIABETIC POLYNEUROPATHY, WITHOUT LONG-TERM CURRENT USE OF INSULIN (HCC): ICD-10-CM

## 2018-01-01 DIAGNOSIS — Z86.73 REMOTE HISTORY OF STROKE: ICD-10-CM

## 2018-01-01 DIAGNOSIS — N39.0 URINARY TRACT INFECTION WITH HEMATURIA, SITE UNSPECIFIED: Primary | ICD-10-CM

## 2018-01-01 DIAGNOSIS — M48.062 LUMBAR STENOSIS WITH NEUROGENIC CLAUDICATION: Primary | ICD-10-CM

## 2018-01-01 DIAGNOSIS — Z23 NEED FOR PROPHYLACTIC VACCINATION AND INOCULATION AGAINST INFLUENZA: ICD-10-CM

## 2018-01-01 DIAGNOSIS — M21.372 FOOT DROP, BILATERAL: ICD-10-CM

## 2018-01-01 DIAGNOSIS — N18.30 STAGE 3 CHRONIC KIDNEY DISEASE (HCC): ICD-10-CM

## 2018-01-01 DIAGNOSIS — G60.9 IDIOPATHIC PERIPHERAL NEUROPATHY: ICD-10-CM

## 2018-01-01 DIAGNOSIS — M21.371 FOOT DROP, BILATERAL: ICD-10-CM

## 2018-01-01 DIAGNOSIS — M48.062 SPINAL STENOSIS OF LUMBAR REGION WITH NEUROGENIC CLAUDICATION: ICD-10-CM

## 2018-01-01 DIAGNOSIS — M21.372 LEFT FOOT DROP: Primary | ICD-10-CM

## 2018-01-01 DIAGNOSIS — I21.3 ST ELEVATION MYOCARDIAL INFARCTION (STEMI), UNSPECIFIED ARTERY (HCC): Primary | ICD-10-CM

## 2018-01-01 DIAGNOSIS — I25.10 CAD IN NATIVE ARTERY: ICD-10-CM

## 2018-01-01 DIAGNOSIS — G93.9 BRAIN LESION: ICD-10-CM

## 2018-01-01 DIAGNOSIS — M48.061 SPINAL STENOSIS OF LUMBAR REGION WITHOUT NEUROGENIC CLAUDICATION: ICD-10-CM

## 2018-01-01 DIAGNOSIS — N18.30 CHRONIC RENAL IMPAIRMENT, STAGE 3 (MODERATE) (HCC): ICD-10-CM

## 2018-01-01 DIAGNOSIS — L03.114 CELLULITIS OF LEFT UPPER EXTREMITY: ICD-10-CM

## 2018-01-01 DIAGNOSIS — R06.89 CO2 NARCOSIS: Primary | ICD-10-CM

## 2018-01-01 DIAGNOSIS — R30.9 URINARY PAIN: Primary | ICD-10-CM

## 2018-01-01 DIAGNOSIS — I82.401 ACUTE DEEP VEIN THROMBOSIS (DVT) OF RIGHT LOWER EXTREMITY, UNSPECIFIED VEIN (HCC): ICD-10-CM

## 2018-01-01 DIAGNOSIS — R09.02 HYPOXIA: ICD-10-CM

## 2018-01-01 LAB
A/G RATIO: 1.4 (ref 1.1–2.2)
A/G RATIO: 1.5 (ref 1.1–2.2)
A/G RATIO: 2.3 (ref 1.1–2.2)
ALBUMIN SERPL-MCNC: 3.1 G/DL (ref 3.1–4.9)
ALBUMIN SERPL-MCNC: 3.8 G/DL (ref 3.4–5)
ALBUMIN SERPL-MCNC: 3.9 G/DL (ref 3.4–5)
ALBUMIN SERPL-MCNC: 4.5 G/DL (ref 3.4–5)
ALP BLD-CCNC: 46 U/L (ref 40–129)
ALP BLD-CCNC: 50 U/L (ref 40–129)
ALP BLD-CCNC: 55 U/L (ref 40–129)
ALPHA-1-GLOBULIN: 0.3 G/DL (ref 0.2–0.4)
ALPHA-2-GLOBULIN: 0.9 G/DL (ref 0.4–1.1)
ALT SERPL-CCNC: 12 U/L (ref 10–40)
ALT SERPL-CCNC: 14 U/L (ref 10–40)
ALT SERPL-CCNC: 16 U/L (ref 10–40)
ANA INTERPRETATION: NORMAL
ANION GAP SERPL CALCULATED.3IONS-SCNC: 11 MMOL/L (ref 3–16)
ANION GAP SERPL CALCULATED.3IONS-SCNC: 12 MMOL/L (ref 3–16)
ANION GAP SERPL CALCULATED.3IONS-SCNC: 14 MMOL/L (ref 3–16)
ANION GAP SERPL CALCULATED.3IONS-SCNC: 7 MMOL/L (ref 3–16)
ANION GAP SERPL CALCULATED.3IONS-SCNC: 9 MMOL/L (ref 3–16)
ANTI-NUCLEAR ANTIBODY (ANA): NEGATIVE
AST SERPL-CCNC: 15 U/L (ref 15–37)
AST SERPL-CCNC: 17 U/L (ref 15–37)
AST SERPL-CCNC: 17 U/L (ref 15–37)
BASE EXCESS ARTERIAL: 4.7 MMOL/L (ref -3–3)
BASE EXCESS ARTERIAL: 5.1 MMOL/L (ref -3–3)
BASE EXCESS ARTERIAL: 9.5 MMOL/L (ref -3–3)
BASOPHILS ABSOLUTE: 0.2 K/UL (ref 0–0.2)
BASOPHILS ABSOLUTE: 0.2 K/UL (ref 0–0.2)
BASOPHILS RELATIVE PERCENT: 1.6 %
BASOPHILS RELATIVE PERCENT: 2 %
BETA GLOBULIN: 1 G/DL (ref 0.9–1.6)
BILIRUB SERPL-MCNC: 0.4 MG/DL (ref 0–1)
BILIRUB SERPL-MCNC: 0.5 MG/DL (ref 0–1)
BILIRUB SERPL-MCNC: 0.6 MG/DL (ref 0–1)
BILIRUBIN, POC: ABNORMAL
BILIRUBIN, POC: ABNORMAL
BLOOD CULTURE, ROUTINE: NORMAL
BLOOD URINE, POC: ABNORMAL
BLOOD URINE, POC: ABNORMAL
BUN BLDV-MCNC: 34 MG/DL (ref 7–20)
BUN BLDV-MCNC: 35 MG/DL (ref 7–20)
BUN BLDV-MCNC: 38 MG/DL (ref 7–20)
BUN BLDV-MCNC: 39 MG/DL (ref 7–20)
BUN BLDV-MCNC: 40 MG/DL (ref 7–20)
CALCIUM SERPL-MCNC: 8.3 MG/DL (ref 8.3–10.6)
CALCIUM SERPL-MCNC: 8.7 MG/DL (ref 8.3–10.6)
CALCIUM SERPL-MCNC: 9.1 MG/DL (ref 8.3–10.6)
CALCIUM SERPL-MCNC: 9.2 MG/DL (ref 8.3–10.6)
CALCIUM SERPL-MCNC: 9.3 MG/DL (ref 8.3–10.6)
CARBOXYHEMOGLOBIN ARTERIAL: 0.9 % (ref 0–1.5)
CARBOXYHEMOGLOBIN ARTERIAL: 1 % (ref 0–1.5)
CARBOXYHEMOGLOBIN ARTERIAL: 1.1 % (ref 0–1.5)
CHLORIDE BLD-SCNC: 100 MMOL/L (ref 99–110)
CHLORIDE BLD-SCNC: 100 MMOL/L (ref 99–110)
CHLORIDE BLD-SCNC: 101 MMOL/L (ref 99–110)
CHLORIDE BLD-SCNC: 101 MMOL/L (ref 99–110)
CHLORIDE BLD-SCNC: 98 MMOL/L (ref 99–110)
CHOLESTEROL, TOTAL: 124 MG/DL (ref 0–199)
CHOLESTEROL, TOTAL: 129 MG/DL (ref 0–199)
CLARITY, POC: CLEAR
CLARITY, POC: CLEAR
CO2: 31 MMOL/L (ref 21–32)
CO2: 31 MMOL/L (ref 21–32)
CO2: 32 MMOL/L (ref 21–32)
CO2: 32 MMOL/L (ref 21–32)
CO2: 33 MMOL/L (ref 21–32)
COLOR, POC: YELLOW
COLOR, POC: YELLOW
CREAT SERPL-MCNC: 1 MG/DL (ref 0.8–1.3)
CREAT SERPL-MCNC: 1.2 MG/DL (ref 0.8–1.3)
CREAT SERPL-MCNC: 1.3 MG/DL (ref 0.8–1.3)
CREAT SERPL-MCNC: 1.3 MG/DL (ref 0.8–1.3)
CREAT SERPL-MCNC: 1.5 MG/DL (ref 0.8–1.3)
EKG ATRIAL RATE: 88 BPM
EKG DIAGNOSIS: NORMAL
EKG P AXIS: 8 DEGREES
EKG P-R INTERVAL: 158 MS
EKG Q-T INTERVAL: 378 MS
EKG QRS DURATION: 142 MS
EKG QTC CALCULATION (BAZETT): 457 MS
EKG R AXIS: 111 DEGREES
EKG T AXIS: -31 DEGREES
EKG VENTRICULAR RATE: 88 BPM
EOSINOPHILS ABSOLUTE: 0.2 K/UL (ref 0–0.6)
EOSINOPHILS ABSOLUTE: 0.2 K/UL (ref 0–0.6)
EOSINOPHILS RELATIVE PERCENT: 1.4 %
EOSINOPHILS RELATIVE PERCENT: 1.5 %
ESTIMATED AVERAGE GLUCOSE: 108.3 MG/DL
FOLATE: 10.99 NG/ML (ref 4.78–24.2)
GAMMA GLOBULIN: 1 G/DL (ref 0.6–1.8)
GFR AFRICAN AMERICAN: 54
GFR AFRICAN AMERICAN: >60
GFR NON-AFRICAN AMERICAN: 45
GFR NON-AFRICAN AMERICAN: 53
GFR NON-AFRICAN AMERICAN: 53
GFR NON-AFRICAN AMERICAN: 58
GFR NON-AFRICAN AMERICAN: >60
GLOBULIN: 2 G/DL
GLOBULIN: 2.6 G/DL
GLOBULIN: 2.7 G/DL
GLUCOSE BLD-MCNC: 70 MG/DL (ref 70–99)
GLUCOSE BLD-MCNC: 79 MG/DL (ref 70–99)
GLUCOSE BLD-MCNC: 86 MG/DL (ref 70–99)
GLUCOSE BLD-MCNC: 86 MG/DL (ref 70–99)
GLUCOSE BLD-MCNC: 92 MG/DL (ref 70–99)
GLUCOSE URINE, POC: ABNORMAL
GLUCOSE URINE, POC: ABNORMAL
HBA1C MFR BLD: 5.4 %
HCO3 ARTERIAL: 32.8 MMOL/L (ref 21–29)
HCO3 ARTERIAL: 34.1 MMOL/L (ref 21–29)
HCO3 ARTERIAL: 36.7 MMOL/L (ref 21–29)
HCT VFR BLD CALC: 34.4 % (ref 40.5–52.5)
HCT VFR BLD CALC: 38.9 % (ref 40.5–52.5)
HCT VFR BLD CALC: 39.7 % (ref 40.5–52.5)
HCT VFR BLD CALC: 40.8 % (ref 40.5–52.5)
HDLC SERPL-MCNC: 52 MG/DL (ref 40–60)
HDLC SERPL-MCNC: 56 MG/DL (ref 40–60)
HEMATOLOGY PATH CONSULT: NO
HEMOGLOBIN, ART, EXTENDED: 11.3 G/DL (ref 13.5–17.5)
HEMOGLOBIN, ART, EXTENDED: 11.6 G/DL (ref 13.5–17.5)
HEMOGLOBIN, ART, EXTENDED: 12 G/DL (ref 13.5–17.5)
HEMOGLOBIN: 10 G/DL (ref 13.5–17.5)
HEMOGLOBIN: 11.1 G/DL (ref 13.5–17.5)
HEMOGLOBIN: 11.3 G/DL (ref 13.5–17.5)
HEMOGLOBIN: 11.4 G/DL (ref 13.5–17.5)
KETONES, POC: ABNORMAL
KETONES, POC: ABNORMAL
LACTIC ACID: 0.6 MMOL/L (ref 0.4–2)
LDL CHOLESTEROL CALCULATED: 48 MG/DL
LDL CHOLESTEROL CALCULATED: 53 MG/DL
LEUKOCYTE EST, POC: ABNORMAL
LEUKOCYTE EST, POC: ABNORMAL
LYMPHOCYTES ABSOLUTE: 0.6 K/UL (ref 1–5.1)
LYMPHOCYTES ABSOLUTE: 0.6 K/UL (ref 1–5.1)
LYMPHOCYTES RELATIVE PERCENT: 4.8 %
LYMPHOCYTES RELATIVE PERCENT: 5 %
MCH RBC QN AUTO: 17.5 PG (ref 26–34)
MCH RBC QN AUTO: 17.7 PG (ref 26–34)
MCH RBC QN AUTO: 17.9 PG (ref 26–34)
MCH RBC QN AUTO: 18.1 PG (ref 26–34)
MCHC RBC AUTO-ENTMCNC: 28 G/DL (ref 31–36)
MCHC RBC AUTO-ENTMCNC: 28.4 G/DL (ref 31–36)
MCHC RBC AUTO-ENTMCNC: 28.7 G/DL (ref 31–36)
MCHC RBC AUTO-ENTMCNC: 29 G/DL (ref 31–36)
MCV RBC AUTO: 61.6 FL (ref 80–100)
MCV RBC AUTO: 61.6 FL (ref 80–100)
MCV RBC AUTO: 62.6 FL (ref 80–100)
MCV RBC AUTO: 63.9 FL (ref 80–100)
METHEMOGLOBIN ARTERIAL: 0.5 %
METHEMOGLOBIN ARTERIAL: 0.6 %
METHEMOGLOBIN ARTERIAL: 0.8 %
MONOCYTES ABSOLUTE: 0.8 K/UL (ref 0–1.3)
MONOCYTES ABSOLUTE: 1.1 K/UL (ref 0–1.3)
MONOCYTES RELATIVE PERCENT: 6.5 %
MONOCYTES RELATIVE PERCENT: 9.5 %
NEUTROPHILS ABSOLUTE: 10.6 K/UL (ref 1.7–7.7)
NEUTROPHILS ABSOLUTE: 9.8 K/UL (ref 1.7–7.7)
NEUTROPHILS RELATIVE PERCENT: 82.2 %
NEUTROPHILS RELATIVE PERCENT: 85.5 %
NITRITE, POC: ABNORMAL
NITRITE, POC: ABNORMAL
O2 CONTENT ARTERIAL: 15 ML/DL
O2 CONTENT ARTERIAL: 15 ML/DL
O2 CONTENT ARTERIAL: 8 ML/DL
O2 SAT, ARTERIAL: 50 %
O2 SAT, ARTERIAL: 87.3 %
O2 SAT, ARTERIAL: 93.2 %
O2 THERAPY: ABNORMAL
PCO2 ARTERIAL: 63.4 MMHG (ref 35–45)
PCO2 ARTERIAL: 65.2 MMHG (ref 35–45)
PCO2 ARTERIAL: 80.1 MMHG (ref 35–45)
PDW BLD-RTO: 18.8 % (ref 12.4–15.4)
PDW BLD-RTO: 18.9 % (ref 12.4–15.4)
PDW BLD-RTO: 19.1 % (ref 12.4–15.4)
PDW BLD-RTO: 19.3 % (ref 12.4–15.4)
PH ARTERIAL: 7.25 (ref 7.35–7.45)
PH ARTERIAL: 7.32 (ref 7.35–7.45)
PH ARTERIAL: 7.38 (ref 7.35–7.45)
PH, POC: 6
PH, POC: 6
PLATELET # BLD: 394 K/UL (ref 135–450)
PLATELET # BLD: 405 K/UL (ref 135–450)
PLATELET # BLD: 446 K/UL (ref 135–450)
PLATELET # BLD: 467 K/UL (ref 135–450)
PLATELET SLIDE REVIEW: ADEQUATE
PLATELET SLIDE REVIEW: ADEQUATE
PMV BLD AUTO: 8.3 FL (ref 5–10.5)
PMV BLD AUTO: 9.2 FL (ref 5–10.5)
PMV BLD AUTO: 9.7 FL (ref 5–10.5)
PMV BLD AUTO: 9.8 FL (ref 5–10.5)
PO2 ARTERIAL: 28.9 MMHG (ref 75–108)
PO2 ARTERIAL: 51.9 MMHG (ref 75–108)
PO2 ARTERIAL: 68.3 MMHG (ref 75–108)
POTASSIUM REFLEX MAGNESIUM: 4.4 MMOL/L (ref 3.5–5.1)
POTASSIUM SERPL-SCNC: 4.3 MMOL/L (ref 3.5–5.1)
POTASSIUM SERPL-SCNC: 4.4 MMOL/L (ref 3.5–5.1)
POTASSIUM SERPL-SCNC: 4.9 MMOL/L (ref 3.5–5.1)
POTASSIUM SERPL-SCNC: 4.9 MMOL/L (ref 3.5–5.1)
PRO-BNP: 3703 PG/ML (ref 0–449)
PROTEIN, POC: ABNORMAL
PROTEIN, POC: ABNORMAL
RBC # BLD: 5.59 M/UL (ref 4.2–5.9)
RBC # BLD: 6.21 M/UL (ref 4.2–5.9)
RBC # BLD: 6.31 M/UL (ref 4.2–5.9)
RBC # BLD: 6.51 M/UL (ref 4.2–5.9)
SEDIMENTATION RATE, ERYTHROCYTE: 0 MM/HR (ref 0–20)
SLIDE REVIEW: ABNORMAL
SODIUM BLD-SCNC: 140 MMOL/L (ref 136–145)
SODIUM BLD-SCNC: 142 MMOL/L (ref 136–145)
SODIUM BLD-SCNC: 143 MMOL/L (ref 136–145)
SODIUM BLD-SCNC: 143 MMOL/L (ref 136–145)
SODIUM BLD-SCNC: 144 MMOL/L (ref 136–145)
SPE/IFE INTERPRETATION: NORMAL
SPECIFIC GRAVITY, POC: 1.01
SPECIFIC GRAVITY, POC: 1.01
TCO2 ARTERIAL: 34.8 MMOL/L
TCO2 ARTERIAL: 36.6 MMOL/L
TCO2 ARTERIAL: 38.6 MMOL/L
TOTAL PROTEIN: 6.4 G/DL (ref 6.4–8.2)
TOTAL PROTEIN: 6.4 G/DL (ref 6.4–8.2)
TOTAL PROTEIN: 6.5 G/DL (ref 6.4–8.2)
TOTAL PROTEIN: 6.6 G/DL (ref 6.4–8.2)
TRIGL SERPL-MCNC: 127 MG/DL (ref 0–150)
TRIGL SERPL-MCNC: 94 MG/DL (ref 0–150)
UROBILINOGEN, POC: 0.2
UROBILINOGEN, POC: 0.2
VITAMIN B-12: 819 PG/ML (ref 211–911)
VLDLC SERPL CALC-MCNC: 19 MG/DL
VLDLC SERPL CALC-MCNC: 25 MG/DL
WBC # BLD: 11.9 K/UL (ref 4–11)
WBC # BLD: 12.4 K/UL (ref 4–11)
WBC # BLD: 13.3 K/UL (ref 4–11)
WBC # BLD: 13.7 K/UL (ref 4–11)

## 2018-01-01 PROCEDURE — 80048 BASIC METABOLIC PNL TOTAL CA: CPT

## 2018-01-01 PROCEDURE — 71045 X-RAY EXAM CHEST 1 VIEW: CPT

## 2018-01-01 PROCEDURE — 83036 HEMOGLOBIN GLYCOSYLATED A1C: CPT

## 2018-01-01 PROCEDURE — 99213 OFFICE O/P EST LOW 20 MIN: CPT | Performed by: ORTHOPAEDIC SURGERY

## 2018-01-01 PROCEDURE — 99214 OFFICE O/P EST MOD 30 MIN: CPT | Performed by: INTERNAL MEDICINE

## 2018-01-01 PROCEDURE — 82803 BLOOD GASES ANY COMBINATION: CPT

## 2018-01-01 PROCEDURE — L2820 SOFT INTERFACE BELOW KNEE SE: HCPCS | Performed by: PEDORTHIST

## 2018-01-01 PROCEDURE — 2700000000 HC OXYGEN THERAPY PER DAY

## 2018-01-01 PROCEDURE — 93010 ELECTROCARDIOGRAM REPORT: CPT | Performed by: INTERNAL MEDICINE

## 2018-01-01 PROCEDURE — 9900000038 HC CARDIAC REHAB PHASE 3 - MONTHLY

## 2018-01-01 PROCEDURE — 99232 SBSQ HOSP IP/OBS MODERATE 35: CPT | Performed by: INTERNAL MEDICINE

## 2018-01-01 PROCEDURE — 81002 URINALYSIS NONAUTO W/O SCOPE: CPT | Performed by: INTERNAL MEDICINE

## 2018-01-01 PROCEDURE — 1123F ACP DISCUSS/DSCN MKR DOCD: CPT | Performed by: INTERNAL MEDICINE

## 2018-01-01 PROCEDURE — 85027 COMPLETE CBC AUTOMATED: CPT

## 2018-01-01 PROCEDURE — G8598 ASA/ANTIPLAT THER USED: HCPCS | Performed by: INTERNAL MEDICINE

## 2018-01-01 PROCEDURE — 1036F TOBACCO NON-USER: CPT | Performed by: INTERNAL MEDICINE

## 2018-01-01 PROCEDURE — 94667 MNPJ CHEST WALL 1ST: CPT

## 2018-01-01 PROCEDURE — G8427 DOCREV CUR MEDS BY ELIG CLIN: HCPCS | Performed by: PHYSICAL MEDICINE & REHABILITATION

## 2018-01-01 PROCEDURE — 1123F ACP DISCUSS/DSCN MKR DOCD: CPT | Performed by: PHYSICAL MEDICINE & REHABILITATION

## 2018-01-01 PROCEDURE — 84165 PROTEIN E-PHORESIS SERUM: CPT

## 2018-01-01 PROCEDURE — G8420 CALC BMI NORM PARAMETERS: HCPCS | Performed by: INTERNAL MEDICINE

## 2018-01-01 PROCEDURE — 1101F PT FALLS ASSESS-DOCD LE1/YR: CPT | Performed by: PHYSICAL MEDICINE & REHABILITATION

## 2018-01-01 PROCEDURE — 83605 ASSAY OF LACTIC ACID: CPT

## 2018-01-01 PROCEDURE — 36415 COLL VENOUS BLD VENIPUNCTURE: CPT

## 2018-01-01 PROCEDURE — 99233 SBSQ HOSP IP/OBS HIGH 50: CPT | Performed by: INTERNAL MEDICINE

## 2018-01-01 PROCEDURE — G8427 DOCREV CUR MEDS BY ELIG CLIN: HCPCS | Performed by: INTERNAL MEDICINE

## 2018-01-01 PROCEDURE — G8420 CALC BMI NORM PARAMETERS: HCPCS | Performed by: PSYCHIATRY & NEUROLOGY

## 2018-01-01 PROCEDURE — 93005 ELECTROCARDIOGRAM TRACING: CPT | Performed by: EMERGENCY MEDICINE

## 2018-01-01 PROCEDURE — 85025 COMPLETE CBC W/AUTO DIFF WBC: CPT

## 2018-01-01 PROCEDURE — 94761 N-INVAS EAR/PLS OXIMETRY MLT: CPT

## 2018-01-01 PROCEDURE — 72148 MRI LUMBAR SPINE W/O DYE: CPT

## 2018-01-01 PROCEDURE — G8598 ASA/ANTIPLAT THER USED: HCPCS | Performed by: PSYCHIATRY & NEUROLOGY

## 2018-01-01 PROCEDURE — 99214 OFFICE O/P EST MOD 30 MIN: CPT | Performed by: PSYCHIATRY & NEUROLOGY

## 2018-01-01 PROCEDURE — G0008 ADMIN INFLUENZA VIRUS VAC: HCPCS | Performed by: INTERNAL MEDICINE

## 2018-01-01 PROCEDURE — 2580000003 HC RX 258: Performed by: INTERNAL MEDICINE

## 2018-01-01 PROCEDURE — G8482 FLU IMMUNIZE ORDER/ADMIN: HCPCS | Performed by: PSYCHIATRY & NEUROLOGY

## 2018-01-01 PROCEDURE — L1960 AFO POS SOLID ANK PLASTIC MO: HCPCS | Performed by: PEDORTHIST

## 2018-01-01 PROCEDURE — 84155 ASSAY OF PROTEIN SERUM: CPT

## 2018-01-01 PROCEDURE — G8482 FLU IMMUNIZE ORDER/ADMIN: HCPCS | Performed by: INTERNAL MEDICINE

## 2018-01-01 PROCEDURE — 70551 MRI BRAIN STEM W/O DYE: CPT

## 2018-01-01 PROCEDURE — 94664 DEMO&/EVAL PT USE INHALER: CPT

## 2018-01-01 PROCEDURE — 1101F PT FALLS ASSESS-DOCD LE1/YR: CPT | Performed by: INTERNAL MEDICINE

## 2018-01-01 PROCEDURE — 87040 BLOOD CULTURE FOR BACTERIA: CPT

## 2018-01-01 PROCEDURE — 36600 WITHDRAWAL OF ARTERIAL BLOOD: CPT

## 2018-01-01 PROCEDURE — 4040F PNEUMOC VAC/ADMIN/RCVD: CPT | Performed by: INTERNAL MEDICINE

## 2018-01-01 PROCEDURE — 6370000000 HC RX 637 (ALT 250 FOR IP): Performed by: INTERNAL MEDICINE

## 2018-01-01 PROCEDURE — 4040F PNEUMOC VAC/ADMIN/RCVD: CPT | Performed by: PHYSICAL MEDICINE & REHABILITATION

## 2018-01-01 PROCEDURE — 99223 1ST HOSP IP/OBS HIGH 75: CPT | Performed by: INTERNAL MEDICINE

## 2018-01-01 PROCEDURE — G8420 CALC BMI NORM PARAMETERS: HCPCS | Performed by: PHYSICAL MEDICINE & REHABILITATION

## 2018-01-01 PROCEDURE — G8598 ASA/ANTIPLAT THER USED: HCPCS | Performed by: ORTHOPAEDIC SURGERY

## 2018-01-01 PROCEDURE — 1123F ACP DISCUSS/DSCN MKR DOCD: CPT | Performed by: PSYCHIATRY & NEUROLOGY

## 2018-01-01 PROCEDURE — 94660 CPAP INITIATION&MGMT: CPT

## 2018-01-01 PROCEDURE — L2275 PLASTIC MOD LOW EXT PAD/LINE: HCPCS | Performed by: PEDORTHIST

## 2018-01-01 PROCEDURE — 4040F PNEUMOC VAC/ADMIN/RCVD: CPT | Performed by: ORTHOPAEDIC SURGERY

## 2018-01-01 PROCEDURE — 4040F PNEUMOC VAC/ADMIN/RCVD: CPT | Performed by: PSYCHIATRY & NEUROLOGY

## 2018-01-01 PROCEDURE — 1123F ACP DISCUSS/DSCN MKR DOCD: CPT | Performed by: ORTHOPAEDIC SURGERY

## 2018-01-01 PROCEDURE — 70360 X-RAY EXAM OF NECK: CPT

## 2018-01-01 PROCEDURE — 82607 VITAMIN B-12: CPT

## 2018-01-01 PROCEDURE — 1101F PT FALLS ASSESS-DOCD LE1/YR: CPT | Performed by: PSYCHIATRY & NEUROLOGY

## 2018-01-01 PROCEDURE — 86038 ANTINUCLEAR ANTIBODIES: CPT

## 2018-01-01 PROCEDURE — 80061 LIPID PANEL: CPT

## 2018-01-01 PROCEDURE — 99285 EMERGENCY DEPT VISIT HI MDM: CPT

## 2018-01-01 PROCEDURE — 1101F PT FALLS ASSESS-DOCD LE1/YR: CPT | Performed by: ORTHOPAEDIC SURGERY

## 2018-01-01 PROCEDURE — G8427 DOCREV CUR MEDS BY ELIG CLIN: HCPCS | Performed by: PSYCHIATRY & NEUROLOGY

## 2018-01-01 PROCEDURE — 2060000000 HC ICU INTERMEDIATE R&B

## 2018-01-01 PROCEDURE — 86334 IMMUNOFIX E-PHORESIS SERUM: CPT

## 2018-01-01 PROCEDURE — 1036F TOBACCO NON-USER: CPT | Performed by: PSYCHIATRY & NEUROLOGY

## 2018-01-01 PROCEDURE — 1036F TOBACCO NON-USER: CPT | Performed by: ORTHOPAEDIC SURGERY

## 2018-01-01 PROCEDURE — 99283 EMERGENCY DEPT VISIT LOW MDM: CPT

## 2018-01-01 PROCEDURE — 83880 ASSAY OF NATRIURETIC PEPTIDE: CPT

## 2018-01-01 PROCEDURE — 90662 IIV NO PRSV INCREASED AG IM: CPT | Performed by: INTERNAL MEDICINE

## 2018-01-01 PROCEDURE — G8420 CALC BMI NORM PARAMETERS: HCPCS | Performed by: ORTHOPAEDIC SURGERY

## 2018-01-01 PROCEDURE — G8427 DOCREV CUR MEDS BY ELIG CLIN: HCPCS | Performed by: ORTHOPAEDIC SURGERY

## 2018-01-01 PROCEDURE — 74018 RADEX ABDOMEN 1 VIEW: CPT

## 2018-01-01 PROCEDURE — 99203 OFFICE O/P NEW LOW 30 MIN: CPT | Performed by: PHYSICAL MEDICINE & REHABILITATION

## 2018-01-01 PROCEDURE — 1036F TOBACCO NON-USER: CPT | Performed by: PHYSICAL MEDICINE & REHABILITATION

## 2018-01-01 PROCEDURE — 82746 ASSAY OF FOLIC ACID SERUM: CPT

## 2018-01-01 PROCEDURE — 80053 COMPREHEN METABOLIC PANEL: CPT

## 2018-01-01 PROCEDURE — 85652 RBC SED RATE AUTOMATED: CPT

## 2018-01-01 PROCEDURE — G8598 ASA/ANTIPLAT THER USED: HCPCS | Performed by: PHYSICAL MEDICINE & REHABILITATION

## 2018-01-01 RX ORDER — PRAVASTATIN SODIUM 80 MG/1
80 TABLET ORAL NIGHTLY
Status: DISCONTINUED | OUTPATIENT
Start: 2018-01-01 | End: 2018-01-01 | Stop reason: HOSPADM

## 2018-01-01 RX ORDER — FUROSEMIDE 40 MG/1
40 TABLET ORAL DAILY
Status: DISCONTINUED | OUTPATIENT
Start: 2018-01-01 | End: 2018-01-01 | Stop reason: HOSPADM

## 2018-01-01 RX ORDER — PANTOPRAZOLE SODIUM 40 MG/1
40 TABLET, DELAYED RELEASE ORAL
Status: DISCONTINUED | OUTPATIENT
Start: 2018-01-01 | End: 2018-01-01 | Stop reason: HOSPADM

## 2018-01-01 RX ORDER — SODIUM CHLORIDE 0.9 % (FLUSH) 0.9 %
10 SYRINGE (ML) INJECTION EVERY 12 HOURS SCHEDULED
Status: DISCONTINUED | OUTPATIENT
Start: 2018-01-01 | End: 2018-01-01 | Stop reason: HOSPADM

## 2018-01-01 RX ORDER — SPIRONOLACTONE 25 MG/1
25 TABLET ORAL DAILY
Status: DISCONTINUED | OUTPATIENT
Start: 2018-01-01 | End: 2018-01-01 | Stop reason: HOSPADM

## 2018-01-01 RX ORDER — SILDENAFIL CITRATE 20 MG/1
20 TABLET ORAL 3 TIMES DAILY
Status: DISCONTINUED | OUTPATIENT
Start: 2018-01-01 | End: 2018-01-01

## 2018-01-01 RX ORDER — FLUTICASONE PROPIONATE 50 MCG
1 SPRAY, SUSPENSION (ML) NASAL DAILY
Status: DISCONTINUED | OUTPATIENT
Start: 2018-01-01 | End: 2018-01-01 | Stop reason: HOSPADM

## 2018-01-01 RX ORDER — TADALAFIL 20 MG/1
20 TABLET ORAL 2 TIMES DAILY
Status: DISCONTINUED | OUTPATIENT
Start: 2018-01-01 | End: 2018-01-01 | Stop reason: HOSPADM

## 2018-01-01 RX ORDER — ONDANSETRON 2 MG/ML
4 INJECTION INTRAMUSCULAR; INTRAVENOUS EVERY 6 HOURS PRN
Status: DISCONTINUED | OUTPATIENT
Start: 2018-01-01 | End: 2018-01-01 | Stop reason: HOSPADM

## 2018-01-01 RX ORDER — FAMOTIDINE 20 MG/1
20 TABLET, FILM COATED ORAL DAILY
Status: DISCONTINUED | OUTPATIENT
Start: 2018-01-01 | End: 2018-01-01

## 2018-01-01 RX ORDER — SODIUM CHLORIDE 0.9 % (FLUSH) 0.9 %
10 SYRINGE (ML) INJECTION PRN
Status: DISCONTINUED | OUTPATIENT
Start: 2018-01-01 | End: 2018-01-01 | Stop reason: HOSPADM

## 2018-01-01 RX ORDER — CIPROFLOXACIN 500 MG/1
TABLET, FILM COATED ORAL
Qty: 7 TABLET | Refills: 0 | Status: SHIPPED | OUTPATIENT
Start: 2018-01-01 | End: 2019-01-01 | Stop reason: CLARIF

## 2018-01-01 RX ORDER — TADALAFIL 20 MG/1
20 TABLET ORAL DAILY
Status: DISCONTINUED | OUTPATIENT
Start: 2018-01-01 | End: 2018-01-01 | Stop reason: CLARIF

## 2018-01-01 RX ORDER — NITROGLYCERIN 0.4 MG/1
0.4 TABLET SUBLINGUAL EVERY 5 MIN PRN
Status: DISCONTINUED | OUTPATIENT
Start: 2018-01-01 | End: 2018-01-01 | Stop reason: HOSPADM

## 2018-01-01 RX ORDER — AMBRISENTAN 5 MG/1
5 TABLET, FILM COATED ORAL DAILY
Status: DISCONTINUED | OUTPATIENT
Start: 2018-01-01 | End: 2018-01-01 | Stop reason: HOSPADM

## 2018-01-01 RX ORDER — FUROSEMIDE 40 MG/1
TABLET ORAL
Qty: 60 TABLET | Refills: 1 | Status: SHIPPED | OUTPATIENT
Start: 2018-01-01

## 2018-01-01 RX ORDER — TADALAFIL 20 MG/1
20 TABLET ORAL DAILY
Status: DISCONTINUED | OUTPATIENT
Start: 2018-01-01 | End: 2018-01-01

## 2018-01-01 RX ORDER — CEPHALEXIN 500 MG/1
500 CAPSULE ORAL 3 TIMES DAILY
Qty: 30 CAPSULE | Refills: 0 | Status: SHIPPED | OUTPATIENT
Start: 2018-01-01 | End: 2018-01-01

## 2018-01-01 RX ORDER — DOCUSATE SODIUM 100 MG/1
100 CAPSULE, LIQUID FILLED ORAL 2 TIMES DAILY
Status: DISCONTINUED | OUTPATIENT
Start: 2018-01-01 | End: 2018-01-01 | Stop reason: HOSPADM

## 2018-01-01 RX ADMIN — DOCUSATE SODIUM 100 MG: 100 CAPSULE, LIQUID FILLED ORAL at 10:21

## 2018-01-01 RX ADMIN — APIXABAN 5 MG: 5 TABLET, FILM COATED ORAL at 12:48

## 2018-01-01 RX ADMIN — APIXABAN 5 MG: 5 TABLET, FILM COATED ORAL at 10:28

## 2018-01-01 RX ADMIN — SPIRONOLACTONE 25 MG: 25 TABLET ORAL at 10:20

## 2018-01-01 RX ADMIN — AMBRISENTAN 5 MG: 5 TABLET, FILM COATED ORAL at 10:22

## 2018-01-01 RX ADMIN — SERTRALINE HYDROCHLORIDE 100 MG: 50 TABLET ORAL at 10:20

## 2018-01-01 RX ADMIN — FLUTICASONE PROPIONATE 1 SPRAY: 50 SPRAY, METERED NASAL at 17:10

## 2018-01-01 RX ADMIN — TADALAFIL 20 MG: 20 TABLET ORAL at 20:53

## 2018-01-01 RX ADMIN — FUROSEMIDE 40 MG: 40 TABLET ORAL at 12:49

## 2018-01-01 RX ADMIN — APIXABAN 5 MG: 5 TABLET, FILM COATED ORAL at 08:22

## 2018-01-01 RX ADMIN — Medication 10 ML: at 21:39

## 2018-01-01 RX ADMIN — PANTOPRAZOLE SODIUM 40 MG: 40 TABLET, DELAYED RELEASE ORAL at 05:16

## 2018-01-01 RX ADMIN — APIXABAN 5 MG: 5 TABLET, FILM COATED ORAL at 20:53

## 2018-01-01 RX ADMIN — Medication 10 ML: at 20:53

## 2018-01-01 RX ADMIN — SPIRONOLACTONE 25 MG: 25 TABLET ORAL at 08:21

## 2018-01-01 RX ADMIN — APIXABAN 5 MG: 5 TABLET, FILM COATED ORAL at 21:39

## 2018-01-01 RX ADMIN — PANTOPRAZOLE SODIUM 40 MG: 40 TABLET, DELAYED RELEASE ORAL at 06:11

## 2018-01-01 RX ADMIN — TADALAFIL 20 MG: 20 TABLET ORAL at 08:22

## 2018-01-01 RX ADMIN — PRAVASTATIN SODIUM 80 MG: 80 TABLET ORAL at 20:53

## 2018-01-01 RX ADMIN — SERTRALINE HYDROCHLORIDE 100 MG: 50 TABLET ORAL at 08:22

## 2018-01-01 RX ADMIN — TADALAFIL 20 MG: 20 TABLET ORAL at 21:39

## 2018-01-01 RX ADMIN — PRAVASTATIN SODIUM 80 MG: 80 TABLET ORAL at 21:39

## 2018-01-01 RX ADMIN — SERTRALINE HYDROCHLORIDE 100 MG: 50 TABLET ORAL at 12:49

## 2018-01-01 RX ADMIN — DOCUSATE SODIUM 100 MG: 100 CAPSULE, LIQUID FILLED ORAL at 12:48

## 2018-01-01 RX ADMIN — FUROSEMIDE 40 MG: 40 TABLET ORAL at 10:20

## 2018-01-01 RX ADMIN — FLUTICASONE PROPIONATE 1 SPRAY: 50 SPRAY, METERED NASAL at 10:25

## 2018-01-01 RX ADMIN — DOCUSATE SODIUM 100 MG: 100 CAPSULE, LIQUID FILLED ORAL at 20:53

## 2018-01-01 RX ADMIN — FAMOTIDINE 20 MG: 20 TABLET ORAL at 12:49

## 2018-01-01 RX ADMIN — DOCUSATE SODIUM 100 MG: 100 CAPSULE, LIQUID FILLED ORAL at 21:39

## 2018-01-01 RX ADMIN — TADALAFIL 20 MG: 20 TABLET ORAL at 10:22

## 2018-01-01 RX ADMIN — FLUTICASONE PROPIONATE 1 SPRAY: 50 SPRAY, METERED NASAL at 08:24

## 2018-01-01 RX ADMIN — SPIRONOLACTONE 25 MG: 25 TABLET ORAL at 12:49

## 2018-01-01 RX ADMIN — FUROSEMIDE 40 MG: 40 TABLET ORAL at 08:21

## 2018-01-01 RX ADMIN — Medication 10 ML: at 08:24

## 2018-01-01 RX ADMIN — AMBRISENTAN 5 MG: 5 TABLET, FILM COATED ORAL at 08:23

## 2018-01-01 RX ADMIN — AMBRISENTAN 5 MG: 5 TABLET, FILM COATED ORAL at 20:52

## 2018-01-01 ASSESSMENT — PAIN SCALES - GENERAL
PAINLEVEL_OUTOF10: 0

## 2018-01-01 ASSESSMENT — PATIENT HEALTH QUESTIONNAIRE - PHQ9
SUM OF ALL RESPONSES TO PHQ9 QUESTIONS 1 & 2: 0
SUM OF ALL RESPONSES TO PHQ QUESTIONS 1-9: 0
2. FEELING DOWN, DEPRESSED OR HOPELESS: 0
1. LITTLE INTEREST OR PLEASURE IN DOING THINGS: 0

## 2018-01-04 ENCOUNTER — OFFICE VISIT (OUTPATIENT)
Dept: INTERNAL MEDICINE CLINIC | Age: 83
End: 2018-01-04

## 2018-01-04 VITALS
SYSTOLIC BLOOD PRESSURE: 138 MMHG | DIASTOLIC BLOOD PRESSURE: 70 MMHG | HEIGHT: 70 IN | WEIGHT: 160.8 LBS | BODY MASS INDEX: 23.02 KG/M2

## 2018-01-04 DIAGNOSIS — H61.23 IMPACTED CERUMEN OF BOTH EARS: Primary | ICD-10-CM

## 2018-01-04 DIAGNOSIS — I10 ESSENTIAL HYPERTENSION: ICD-10-CM

## 2018-01-04 PROCEDURE — 99212 OFFICE O/P EST SF 10 MIN: CPT | Performed by: NURSE PRACTITIONER

## 2018-01-04 PROCEDURE — G8598 ASA/ANTIPLAT THER USED: HCPCS | Performed by: NURSE PRACTITIONER

## 2018-01-04 PROCEDURE — 1036F TOBACCO NON-USER: CPT | Performed by: NURSE PRACTITIONER

## 2018-01-04 PROCEDURE — 1123F ACP DISCUSS/DSCN MKR DOCD: CPT | Performed by: NURSE PRACTITIONER

## 2018-01-04 PROCEDURE — 4040F PNEUMOC VAC/ADMIN/RCVD: CPT | Performed by: NURSE PRACTITIONER

## 2018-01-04 PROCEDURE — G8484 FLU IMMUNIZE NO ADMIN: HCPCS | Performed by: NURSE PRACTITIONER

## 2018-01-04 PROCEDURE — G8420 CALC BMI NORM PARAMETERS: HCPCS | Performed by: NURSE PRACTITIONER

## 2018-01-04 PROCEDURE — G8427 DOCREV CUR MEDS BY ELIG CLIN: HCPCS | Performed by: NURSE PRACTITIONER

## 2018-01-16 RX ORDER — ZOLPIDEM TARTRATE 10 MG/1
TABLET ORAL
Qty: 30 TABLET | Refills: 2 | Status: SHIPPED | OUTPATIENT
Start: 2018-01-16 | End: 2018-02-15

## 2018-02-01 ENCOUNTER — HOSPITAL ENCOUNTER (OUTPATIENT)
Dept: OTHER | Age: 83
Discharge: HOME | End: 2018-02-28
Attending: INTERNAL MEDICINE | Admitting: INTERNAL MEDICINE

## 2018-03-01 ENCOUNTER — HOSPITAL ENCOUNTER (OUTPATIENT)
Dept: OTHER | Age: 83
Discharge: HOME | End: 2018-03-31
Attending: INTERNAL MEDICINE | Admitting: INTERNAL MEDICINE

## 2018-03-26 ENCOUNTER — TELEPHONE (OUTPATIENT)
Dept: INTERNAL MEDICINE CLINIC | Age: 83
End: 2018-03-26

## 2018-03-26 RX ORDER — CLINDAMYCIN HYDROCHLORIDE 300 MG/1
300 CAPSULE ORAL 3 TIMES DAILY
Qty: 30 CAPSULE | Refills: 0 | Status: SHIPPED | OUTPATIENT
Start: 2018-03-26 | End: 2018-04-05

## 2018-04-01 ENCOUNTER — HOSPITAL ENCOUNTER (OUTPATIENT)
Dept: OTHER | Age: 83
Discharge: HOME | End: 2018-04-30
Attending: INTERNAL MEDICINE | Admitting: INTERNAL MEDICINE

## 2018-04-02 ENCOUNTER — TELEPHONE (OUTPATIENT)
Dept: INTERNAL MEDICINE CLINIC | Age: 83
End: 2018-04-02

## 2018-04-02 RX ORDER — AZITHROMYCIN 250 MG/1
TABLET, FILM COATED ORAL
Qty: 6 TABLET | Refills: 0 | Status: SHIPPED | OUTPATIENT
Start: 2018-04-02 | End: 2018-01-01 | Stop reason: ALTCHOICE

## 2018-04-12 ENCOUNTER — OFFICE VISIT (OUTPATIENT)
Dept: ORTHOPEDIC SURGERY | Age: 83
End: 2018-04-12

## 2018-04-12 VITALS
BODY MASS INDEX: 23.01 KG/M2 | WEIGHT: 160.72 LBS | DIASTOLIC BLOOD PRESSURE: 64 MMHG | HEART RATE: 80 BPM | SYSTOLIC BLOOD PRESSURE: 124 MMHG | HEIGHT: 70 IN

## 2018-04-12 DIAGNOSIS — M48.062 SPINAL STENOSIS OF LUMBAR REGION WITH NEUROGENIC CLAUDICATION: ICD-10-CM

## 2018-04-12 DIAGNOSIS — R29.898 WEAKNESS OF LEFT LOWER EXTREMITY: Primary | ICD-10-CM

## 2018-04-12 DIAGNOSIS — M21.371 FOOT DROP, BILATERAL: ICD-10-CM

## 2018-04-12 DIAGNOSIS — M21.372 FOOT DROP, BILATERAL: ICD-10-CM

## 2018-04-12 PROCEDURE — G8420 CALC BMI NORM PARAMETERS: HCPCS | Performed by: PHYSICIAN ASSISTANT

## 2018-04-12 PROCEDURE — 99203 OFFICE O/P NEW LOW 30 MIN: CPT | Performed by: PHYSICIAN ASSISTANT

## 2018-04-12 PROCEDURE — 1123F ACP DISCUSS/DSCN MKR DOCD: CPT | Performed by: PHYSICIAN ASSISTANT

## 2018-04-12 PROCEDURE — 4040F PNEUMOC VAC/ADMIN/RCVD: CPT | Performed by: PHYSICIAN ASSISTANT

## 2018-04-12 PROCEDURE — G8427 DOCREV CUR MEDS BY ELIG CLIN: HCPCS | Performed by: PHYSICIAN ASSISTANT

## 2018-04-12 PROCEDURE — 1036F TOBACCO NON-USER: CPT | Performed by: PHYSICIAN ASSISTANT

## 2018-04-12 PROCEDURE — G8598 ASA/ANTIPLAT THER USED: HCPCS | Performed by: PHYSICIAN ASSISTANT

## 2018-07-18 PROBLEM — J98.11 ATELECTASIS: Status: ACTIVE | Noted: 2018-01-01

## 2018-07-18 PROBLEM — J96.02 ACUTE RESPIRATORY FAILURE WITH HYPOXIA AND HYPERCAPNIA (HCC): Status: ACTIVE | Noted: 2018-01-01

## 2018-07-18 PROBLEM — J96.01 ACUTE RESPIRATORY FAILURE WITH HYPOXIA AND HYPERCAPNIA (HCC): Status: ACTIVE | Noted: 2018-01-01

## 2018-07-18 PROBLEM — Z90.2 S/P LOBECTOMY OF LUNG: Status: ACTIVE | Noted: 2018-01-01

## 2018-07-18 NOTE — ED NOTES
Dr Nai Palumbo at bedside to speak with pt and family about plan of care     Petr Jennings RN  07/18/18 1016

## 2018-07-18 NOTE — PROGRESS NOTES
PS hosp @ 1014  RE: CO2, narcosis, hypoxia-icu per Dr. Hung Rousseau returned call @ [address], spoke to Dr. Hung Betancur.

## 2018-07-18 NOTE — CONSULTS
discomfort and nausea vomiting or any altered bowel habits, patient does not have any increasing leg edema and takes a water pill on regular basis  Patient when seen earlier was alert and communicative on the BiPAP and did not have any specific symptoms of concern; patient was saturating 100% on the BiPAP  Patient has history of lung cancer status post right upper lobe lobectomy also patient had radiation therapy following that; patient had surgery for lung cancer about 11 years back; patient has I S plus minus a cappella at home but patient does not do it on regular basis; patient did not have any change in the ambient environment or any sick contacts; except for medication for sleeping which was started no other change of medications in the recent past  No other pertinent review of system of concern  Patient sees Dr Sharifa Mckeon at [facility] [address]AI for pulmonology      Patient Active Problem List    Diagnosis Date Noted    Spinal stenosis      Priority: High    Hypertension      Priority: High    Hyperlipidemia      Priority: High    Prostate CA ([address]      Priority: Medium    Cancer [facility])      Priority: Medium    Anxiety      Priority: Medium    Benign head tremor      Priority: Low    Insomnia      Priority: Low    Acute respiratory failure with hypoxia and hypercapnia ([address] 07/18/2018    Atelectasis 07/18/2018    S/P lobectomy of lung 07/18/2018    Anemia 11/10/2017    'light-for-dates' infant with signs of fetal malnutrition 05/15/2017    Deep vein thrombosis of right lower limb ([address] 05/15/2017    DVT (deep venous thrombosis) ([address] 04/13/2017    Brain metastases ([address] 11/01/2016    Malignant neoplasm of upper lobe of right lung ([address] 07/26/2016    CAD in native artery 07/26/2016    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 COMPARISON: MRI lumbar spine without contrast 05/06/2014. HISTORY: ORDERING SYSTEM PROVIDED HISTORY: Foot drop, bilateral TECHNOLOGIST PROVIDED HISTORY: Ordering Physician Provided Reason for Exam: Lower extremity weakness Acuity: Chronic Type of Exam: Subsequent/Follow-up FINDINGS: BONES/ALIGNMENT: Overall straightening of the lumbar spine. No significant spondylolisthesis. The lumbar vertebral bodies are normal in height. Multilevel disc desiccation and degenerative endplate signal abnormality are noted. No evidence of acute fracture or suspicious marrow signal abnormality. SPINAL CORD: The conus terminates normally. SOFT TISSUES: Multiple renal fluid signal intensity lesions most likely represent cysts. Otherwise no evidence of a paraspinal mass. L1-L2: Minimal diffuse disc bulging. No significant neural foraminal narrowing. No significant spinal canal stenosis. L2-L3: Bilateral facet hypertrophy. Diffuse disc bulging and endplate spurring with small extruded disc components. Mild-to-moderate left and mild right L2 neural foraminal narrowing. Mild spinal canal stenosis. L3-L4: Facet hypertrophy greater on right. Mild disc bulging. Right foraminal disc spur complex. Mild-moderate right L3 neural foraminal narrowing. No significant left foraminal narrowing. Mild right lateral recess effacement. Mild spinal canal stenosis. L4-L5: Bilateral facet hypertrophy and ligamentum flavum infolding. Diffuse disc bulging and endplate spurring greater to the right. Severe right L4 neural foraminal narrowing. Mild-moderate left foraminal narrowing. Significant lateral recess effacement and moderate spinal canal stenosis. The thecal sac is primarily narrowed in transverse dimension. L5-S1: Bilateral facet hypertrophy. Diffuse disc bulging and endplate spurring.   Moderate-severe are    also enlarged. These findings are stable consistent with known pulmonary arterial hypertension.       Coronary artery calcifications: Moderate calcification and/or stents       Cardiac Structures: No CT abnormality.       Pericardium: No pericardial effusion or pericardial thickening.       Pleura: No pleural effusions.       Esophagus:No CT abnormality.       Airways: The patient status post right upper lobectomy. The left lower lobe airways are limited    by volume loss. The central airways otherwise patent.       Lungs: The patient is status post right upper lobectomy. There is architectural distortion in    the medial right lung related to prior surgery and probable radiation therapy. There is    architectural distortion and volume loss in left lower lobe that is also likely related to    radiation change. The subsolid nodules in left lower lobe visualized in before radiation    therapy can no longer be seen. There is a subsolid nodule related to left major fissure on    image 109, series 4 measuring 9 x 5 mm is unchanged since July 2016 where it was new. It    contains solid component measuring 2 to 3 m image 108.       No evidence of diffuse interstitial lung disease. Expiratory imaging does not demonstrate air    trapping.       Upper abdomen: There is a low-density lesion medial aspect of spleen that likely relates to a    renal lesion, stable but incompletely imaged.       Chest wall & imaged bones: No suspicious osseous lesions.       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 Otherwise stable chest.     Results for Heidi Lozano (MRN [id number]) as of 7/18/2018 13:08   Ref.  Range 1/25/2016 17:44 7/18/2018 08:37   Hemoglobin, Art, Extended Latest Ref Range: 13.5 - 17.5 g/dL 13.6 11.6 (L)   pH, Arterial Latest Ref Range: 7.350 - 7.450  7.421 7.247 (L)   pCO2, Arterial Latest Ref Range: 35.0 - bronchodilators patient may help with pulmonary hypertension also in addition to the medications given by Dr. Cleveland Sahu  · Diuretics as per cardiology to continue  · Eliquis was can be continued  · Patient does not need H2 blocker and proton pump inhibitor-patient should discontinue  · Monitor input output and BMP  · The patient has a worsening hyper-somnolence-patient needs to have a ABG and possibly put on BiPAP at that time  · Patient has chronic atelectasis and the CT scan of his chest which was done and February of this year at [facility] was reviewed with the patient and family-no need for any bronchoscopy at this time  · Patient needs to do more often the incentive spirometry and acappella    Further management depending on patient's clinical status and the follow-up on above recommendations    Case discussed with family and nursing        Electronically signed by:  Alden Waters MD    7/18/2018    1:13 PM.

## 2018-07-18 NOTE — ED PROVIDER NOTES
Neutrophils % 85.5 %    Lymphocytes % 5.0 %    Monocytes % 6.5 %    Eosinophils % 1.4 %    Basophils % 1.6 %    Neutrophils # 10.6 (H) 1.7 - 7.7 K/uL    Lymphocytes # 0.6 (L) 1.0 - 5.1 K/uL    Monocytes # 0.8 0.0 - 1.3 K/uL    Eosinophils # 0.2 0.0 - 0.6 K/uL    Basophils # 0.2 0.0 - 0.2 K/uL   Comprehensive Metabolic Panel   Result Value Ref Range    Sodium 140 136 - 145 mmol/L    Potassium 4.9 3.5 - 5.1 mmol/L    Chloride 100 99 - 110 mmol/L    CO2 33 (H) 21 - 32 mmol/L    Anion Gap 7 3 - 16    Glucose 92 70 - 99 mg/dL    BUN 40 (H) 7 - 20 mg/dL    CREATININE 1.3 0.8 - 1.3 mg/dL    GFR Non- 53 (A) >60    GFR African American >60 >60    Calcium 8.7 8.3 - 10.6 mg/dL    Total Protein 6.4 6.4 - 8.2 g/dL    Alb 3.8 3.4 - 5.0 g/dL    Albumin/Globulin Ratio 1.5 1.1 - 2.2    Total Bilirubin 0.4 0.0 - 1.0 mg/dL    Alkaline Phosphatase 50 40 - 129 U/L    ALT 12 10 - 40 U/L    AST 15 15 - 37 U/L    Globulin 2.6 g/dL   Brain Natriuretic Peptide   Result Value Ref Range    Pro-BNP 3,703 (H) 0 - 449 pg/mL   Lactic Acid, Plasma   Result Value Ref Range    Lactic Acid 0.6 0.4 - 2.0 mmol/L   Blood Gas, Arterial   Result Value Ref Range    pH, Arterial 7.247 (L) 7.350 - 7.450    pCO2, Arterial 80.1 (HH) 35.0 - 45.0 mmHg    pO2, Arterial 28.9 (LL) 75.0 - 108.0 mmHg    HCO3, Arterial 34.1 (H) 21.0 - 29.0 mmol/L    Base Excess, Arterial 4.7 (H) -3.0 - 3.0 mmol/L    Hemoglobin, Art, Extended 11.6 (L) 13.5 - 17.5 g/dL    O2 Sat, Arterial 50.0 (L) >92 %    Carboxyhgb, Arterial 1.0 0.0 - 1.5 %    Methemoglobin, Arterial 0.8 <1.5 %    TCO2, Arterial 36.6 Not Established mmol/L    O2 Content, Arterial 8 Not Established mL/dL    O2 Therapy Unknown      EKG  The Ekg interpreted by me shows  normal sinus rhythm with a rate of 88  Axis is   Normal  QTc is  normal  RBBB  ST Segments: normal  No significant change from prior EKG dated Jan 2016        RADIOLOGY  Mri Lumbar Spine Wo Contrast    Result Date: 7/16/2018  EXAMINATION: MRI OF THE LUMBAR SPINE WITHOUT CONTRAST, 7/16/2018 12:05 pm TECHNIQUE: Multiplanar multisequence MRI of the lumbar spine was performed without the administration of intravenous contrast. COMPARISON: MRI lumbar spine without contrast 05/06/2014. HISTORY: ORDERING SYSTEM PROVIDED HISTORY: Foot drop, bilateral TECHNOLOGIST PROVIDED HISTORY: Ordering Physician Provided Reason for Exam: Lower extremity weakness Acuity: Chronic Type of Exam: Subsequent/Follow-up FINDINGS: BONES/ALIGNMENT: Overall straightening of the lumbar spine. No significant spondylolisthesis. The lumbar vertebral bodies are normal in height. Multilevel disc desiccation and degenerative endplate signal abnormality are noted. No evidence of acute fracture or suspicious marrow signal abnormality. SPINAL CORD: The conus terminates normally. SOFT TISSUES: Multiple renal fluid signal intensity lesions most likely represent cysts. Otherwise no evidence of a paraspinal mass. L1-L2: Minimal diffuse disc bulging. No significant neural foraminal narrowing. No significant spinal canal stenosis. L2-L3: Bilateral facet hypertrophy. Diffuse disc bulging and endplate spurring with small extruded disc components. Mild-to-moderate left and mild right L2 neural foraminal narrowing. Mild spinal canal stenosis. L3-L4: Facet hypertrophy greater on right. Mild disc bulging. Right foraminal disc spur complex. Mild-moderate right L3 neural foraminal narrowing. No significant left foraminal narrowing. Mild right lateral recess effacement. Mild spinal canal stenosis. L4-L5: Bilateral facet hypertrophy and ligamentum flavum infolding. Diffuse disc bulging and endplate spurring greater to the right. Severe right L4 neural foraminal narrowing. Mild-moderate left foraminal narrowing. Significant lateral recess effacement and moderate spinal canal stenosis. The thecal sac is primarily narrowed in transverse dimension. L5-S1: Bilateral facet hypertrophy.   Diffuse

## 2018-07-18 NOTE — PROGRESS NOTES
Dr. Pat Gann, pulmonary md, is aware of pulmonary consult.  (see notes @ 21 [phone] 7/18/18)-Tram Antonio

## 2018-07-18 NOTE — H&P
CREATININE  1.3   CALCIUM  8.7     Recent Labs      07/18/18   0823   AST  15   ALT  12   BILITOT  0.4   ALKPHOS  50       Urinalysis:    No results found for: Benjie Jadyn, BACTERIA, RBCUA, BLOODU, [address], [address] [place] [address]    Radiology:     CXR: I have reviewed the CXR with the following interpretation:   No acute pathology noted. EKG:  I have reviewed the EKG with the following interpretation:   NSR, RBBB, no ischemic changes noted. XR CHEST PORTABLE   Final Result   Stable appearance of the chest.  Basilar atelectasis and volume loss. Small   right pleural effusion. No evidence of superimposed acute cardiopulmonary   disease. ASSESSMENT:    Active Hospital Problems    Diagnosis Date Noted    Acute respiratory failure with hypoxia and hypercapnia (HCC) [J96.01, J96.02] 07/18/2018     Priority: High    Atelectasis [J98.11] 07/18/2018    S/P lobectomy of lung [Z90.2] 07/18/2018    CO2 narcosis [R06.89]     Malignant neoplasm of upper lobe of right lung ([address] [C34.11] 07/26/2016    Pulmonary hypertension [I27.20]          PLAN:    Acute on chronic respiratory failure, hypoxic and hypercarbic, admit to telemetry, patient was initiated on BiPAP in the emergency room, gradually improving, ABG done in ER showing pH of 7.24, PCO2 80, O2 sat was 50%. Clinically patient's condition significantly improved, pulmonary consulted, patient was transitioned on nasal cannula oxygen, continue to monitor. Patient uses 3 L oxygen at home. History of pulmonary hypertension, clinically stable, continue current home regimen. Further management as outpatient with Dr. Jaqueline Garcia. History of CAD, hemodynamically stable, continue current regimen, continue to monitor. History of CA lung, history of lobectomy on right upper lobe, clinically stable, continue close follow-up as outpatient. History of DVT, patient is on Eliquis, continue for now.     DVT Prophylaxis: Eliquis  Diet: DIET CARDIAC;  Code Status: Full

## 2018-07-19 NOTE — PROGRESS NOTES
INPATIENT PULMONARY CRITICAL CARE PROGRESS NOTE      Reason for visit    Acute resp failure     SUBJECTIVE:  Patient when seen this morning was feeling somewhat better, patient was not having that pain or shortness of breath, patient for some reason was given BiPAP last night; patient when seen this morning was on 7 L of nasal cannula oxygen with saturation of 95%, patient was afebrile, patient was hemodynamically maintained, patient had normal sinus rhythm on the monitor, patient's urine output as recorded was on the lower side with cumulative fluid balance of -395 mL, patient's by mouth intake was on the lower side, patient was not confused and was much more alert and communicative, patient had acceptable glycemic control, no other pertinent review of system of concern           Physical Exam:  Blood pressure (!) 148/72, pulse 79, temperature 97.8 °F (36.6 °C), temperature source Oral, resp. rate 23, height 5' 9\" (1.753 m), weight 149 lb 9.6 oz (67.9 kg), SpO2 91 %.'   Constitutional:  No acute distress. HENT:  Oropharynx is clear and moist. No thyromegaly. Eyes:  Conjunctivae are normal. Pupils equal, round, and reactive to light. No scleral icterus. Neck: . No tracheal deviation present. No obvious thyroid mass. Cardiovascular: Normal rate, regular rhythm, normal heart sounds. No right ventricular heave. No lower extremity edema. Pulmonary/Chest: No wheezes. No rales. Chest wall is not dull to percussion. No accessory muscle usage or stridor. Decreased breath sound intensity  Abdominal: Soft. Bowel sounds present. No distension or hernia. No tenderness. Musculoskeletal: No cyanosis. No clubbing. No obvious joint deformity. Lymphadenopathy: No cervical or supraclavicular adenopathy. Skin: Skin is warm and dry. No rash or nodules on the exposed extremities. Psychiatric: Normal mood and affect. Behavior is normal.  No anxiety. Neurologic: Alert, awake and oriented. PERRL.   Speech extruded disc components. Mild-to-moderate left and mild right L2 neural foraminal narrowing. Mild spinal canal stenosis. L3-L4: Facet hypertrophy greater on right. Mild disc bulging. Right foraminal disc spur complex. Mild-moderate right L3 neural foraminal narrowing. No significant left foraminal narrowing. Mild right lateral recess effacement. Mild spinal canal stenosis. L4-L5: Bilateral facet hypertrophy and ligamentum flavum infolding. Diffuse disc bulging and endplate spurring greater to the right. Severe right L4 neural foraminal narrowing. Mild-moderate left foraminal narrowing. Significant lateral recess effacement and moderate spinal canal stenosis. The thecal sac is primarily narrowed in transverse dimension. L5-S1: Bilateral facet hypertrophy. Diffuse disc bulging and endplate spurring. Moderate-severe L5 neural foraminal narrowing. Moderate lateral recess effacement and borderline spinal canal stenosis. At L4-5, there is significant lateral recess effacement due to facet hypertrophy and disc bulging. There is moderate spinal canal stenosis at this level. Substantial L4 and L5 neural foraminal narrowing. Xr Chest Portable    Result Date: 7/18/2018  EXAMINATION: SINGLE XRAY VIEW OF THE CHEST 7/18/2018 8:20 am COMPARISON: Chest x-ray January 25, 2016 HISTORY: ORDERING SYSTEM PROVIDED HISTORY: shortness of breath TECHNOLOGIST PROVIDED HISTORY: Reason for exam:->shortness of breath Ordering Physician Provided Reason for Exam: sob Acuity: Acute Type of Exam: Initial FINDINGS: Unchanged basilar atelectasis. No consolidation, pulmonary edema or other acute pulmonary findings. Cardiac and mediastinal shadows are unchanged. Stable deformity of the right thorax, surgical clips are noted. A small amount of right ventral fluid is again demonstrated     Stable appearance of the chest.  Basilar atelectasis and volume loss. Small right pleural effusion.   No evidence of superimposed acute cardiopulmonary disease. Results for Carrie Burnett (MRN [id number]) as of 7/19/2018 19:35   Ref. Range 8/14/2017 11:55 11/6/2017 09:07 5/9/2018 08:34 7/18/2018 08:23 7/19/2018 05:04   Sodium Latest Ref Range: 136 - 145 mmol/L 142 144 143 140 142   Potassium Latest Ref Range: 3.5 - 5.1 mmol/L 4.5 4.2 4.9 4.9 4.4   Chloride Latest Ref Range: 99 - 110 mmol/L 102 100 100 100 101   CO2 Latest Ref Range: 21 - 32 mmol/L 30 30 32 33 (H) 32   BUN Latest Ref Range: 7 - 20 mg/dL 35 (H) 35 (H) 39 (H) 40 (H) 34 (H)   Creatinine Latest Ref Range: 0.8 - 1.3 mg/dL 1.5 (H) 1.4 (H) 1.2 1.3 1.0   Anion Gap Latest Ref Range: 3 - 16  10 14 11 7 9   GFR Non- Latest Ref Range: >60  45 (A) 49 (A) 58 (A) 53 (A) >60   GFR  Latest Ref Range: >60  54 (A) 59 (A) >60 >60 >60   Lactic Acid Latest Ref Range: 0.4 - 2.0 mmol/L    0.6    Glucose Latest Ref Range: 70 - 99 mg/dL 89 83 86 92 86   Calcium Latest Ref Range: 8.3 - 10.6 mg/dL 9.3 9.1 9.3 8.7 8.3   Total Protein Latest Ref Range: 6.4 - 8.2 g/dL 6.3 (L) 6.6 6.5 6.4    Pro-BNP Latest Ref Range: 0 - 449 pg/mL    3,703 (H)    Results for Carrie Burnett (MRN [id number]) as of 7/19/2018 19:35   Ref.  Range 11/15/2017 12:20 5/9/2018 08:34 7/18/2018 08:23 7/19/2018 05:04   WBC Latest Ref Range: 4.0 - 11.0 K/uL  13.3 (H) 12.4 (H) 11.9 (H)   RBC Latest Ref Range: 4.20 - 5.90 M/uL  6.51 (H) 6.31 (H) 5.59   Hemoglobin Quant Latest Ref Range: 13.5 - 17.5 g/dL  11.4 (L) 11.1 (L) 10.0 (L)   Hematocrit Latest Ref Range: 40.5 - 52.5 %  40.8 38.9 (L) 34.4 (L)   MCV Latest Ref Range: 80.0 - 100.0 fL  62.6 (L) 61.6 (L) 61.6 (L)   MCH Latest Ref Range: 26.0 - 34.0 pg  17.5 (L) 17.7 (L) 17.9 (L)   MCHC Latest Ref Range: 31.0 - 36.0 g/dL  28.0 (L) 28.7 (L) 29.0 (L)   MPV Latest Ref Range: 5.0 - 10.5 fL  9.2 8.3 9.8   RDW Latest Ref Range: 12.4 - 15.4 %  19.1 (H) 19.3 (H) 18.8 (H)   Platelet Count Latest Ref Range: 135 - 450 K/uL  394 467 (H) 446   Neutrophils % Latest Units: %   85.5 82.2   Lymphocyte % Latest Units: %   5.0 4.8   Monocytes % Latest Units: %   6.5 9.5   Eosinophils % Latest Units: %   1.4 1.5   Basophils % Latest Units: %   1.6 2.0     Results for Elke Guillen (MRN [id number]) as of 7/19/2018 19:35   Ref. Range 1/25/2016 17:44 7/18/2018 08:37 7/19/2018 06:11   O2 Therapy Unknown Unknown Unknown See comment   Hemoglobin, Art, Extended Latest Ref Range: 13.5 - 17.5 g/dL 13.6 11.6 (L) 11.3 (L)   pH, Arterial Latest Ref Range: 7.350 - 7.450  7.421 7.247 (L) 7.319 (L)   pCO2, Arterial Latest Ref Range: 35.0 - 45.0 mmHg 35.0 80.1 (HH) 65.2 (H)   pO2, Arterial Latest Ref Range: 75.0 - 108.0 mmHg 50.3 (L) 28.9 (LL) 68.3 (L)   HCO3, Arterial Latest Ref Range: 21.0 - 29.0 mmol/L 22.2 34.1 (H) 32.8 (H)   TCO2 (calc), Art Latest Ref Range: Not Established mmol/L 23.3 36.6 34.8   Base Excess, Arterial Latest Ref Range: -3.0 - 3.0 mmol/L -1.6 4.7 (H) 5.1 (H)   O2 Sat, Arterial Latest Ref Range: >92 % 84.9 (L) 50.0 (L) 93.2   O2 Content, Arterial Latest Ref Range: Not Established mL/dL 16 8 15   Methemoglobin, Arterial Latest Ref Range: <1.5 % 0.6 0.8 0.6   Carboxyhgb, Arterial Latest Ref Range: 0.0 - 1.5 % 1.3 1.0 0.9       Assessment:  Principal Problem:    Acute respiratory failure with hypoxia and hypercapnia (HCC)  Active Problems:    Pulmonary hypertension    Malignant neoplasm of upper lobe of right lung (HCC)    Atelectasis    S/P lobectomy of lung    CO2 narcosis  Resolved Problems:    * No resolved hospital problems.  *          Plan:   · Oxygen supplementation to keep saturation between 90-94% only  ·  BiPAP on when necessary basis  · Nursing requested not to put the patient on BiPAP tonight unless the patient is quite somnolent and lethargic  · Will repeat an ABG tomorrow morning-if patient can just to have hypercarbia-we will discuss with the patient about qualifying him for a BiPAP from the hospital  · It is possible that patient had increased

## 2018-07-19 NOTE — PROGRESS NOTES
07/19/18 0333   NIV Type   Equipment Type V60   Mode BIPAP   Mask Type Full face mask   Mask Size Medium   Settings/Measurements   Comfort Level Good   Using Accessory Muscles No   IPAP 12 cmH20   EPAP 6 cmH2O   Resp 23   SpO2 97   FiO2  40 %   Vt Exhaled 512 mL   Mask Leak (lpm) 22 lpm   Skin Protection for O2 Device Yes

## 2018-07-19 NOTE — CARE COORDINATION
[address] Transitions Interview     2018    Patient: Chad Stoddard Patient : 1935   MRN: [id number]  Reason for Admission: C02 Narcosis, Hypoxia  RARS: Readmission Risk Score: 25       Spoke with: patient Leslie Villafana with wife Valiant Spurling      Readmission Risk  Patient Active Problem List   Diagnosis    Benign head tremor    Insomnia    Hypertension    Hyperlipidemia    Cancer ([address]    Anxiety    Prostate CA ([address]    Elevated PSA    Rectal bleeding    Gout    GERD (gastroesophageal reflux disease)    Cellulitis    Chronic renal insufficiency    Hyperkalemia    Spinal stenosis    Sinusitis    Bronchitis    Cough    Pulmonary hypertension    Myocardial infarct    CAD (coronary artery disease)    Malignant neoplasm of upper lobe of right lung ([address]    CAD in native artery    Cerebrovascular accident (CVA) due to thrombosis of right posterior cerebral artery ([address]    HTN (hypertension), benign    Brain metastases ([address]    DVT (deep venous thrombosis) ([address]    'light-for-dates' infant with signs of fetal malnutrition    Deep vein thrombosis of right lower limb (HCC)    Anemia    Acute respiratory failure with hypoxia and hypercapnia (HCC)    Atelectasis    S/P lobectomy of lung    CO2 narcosis       Inpatient Assessment  Care Transitions Summary    Care Transitions Inpatient Review  Medication Review  Are you able to afford your medications?:  With Assistance  What assistance programs is the patient using?:  Other  Other Medication Assistance Program:  grants  How often do you have difficulty taking your medications?:  I always take them as prescribed.   Housing Review  Who do you live with?:  Partner/Spouse/SO  Are you an active caregiver in your home?:  No  Social Support  Do you have a ?:  No  Do you have a [facility]?:  No  Durable Medical Equipment  Patient DME:  Straight cane, Levorn Saqib, Wheelchair, Shower chair, Other  Other Patient DME:  grab bars in shower and around toilet   Patient Home Equipment:  Oxygen, Other  Functional Review  Ability to seek help/take action for Emergent/Urgent situations i.e. fire, crime, inclement weather or health crisis. :  Independent  Ability handle personal hygiene needs (bathing/dressing/grooming):  Needs Assistance  Ability to manage medications:  Dependent  Ability to prepare food:  Needs Assistance  Ability to maintain home (clean home, laundry):  Dependent  Ability to drive and/or has transportation:  Dependent  Ability to do shopping:  Needs Assistance  Is patient able to live independently?:  No  Hearing and Vision  Visual Impairment:  None  Hearing Impairment:  None  Care Transitions Interventions     Other Services:  Declined (Comment: home care referral )      Met with patient to discuss care transition. Role of CTC and care transitions explained to patient. Wife, Harmony Arellano, present and participated in conversation with permission from patient. Lives with wife, does need assist with some ADL's. On 02 cont, through [facility]. Pulmonologist is Dr. Denise Rico at [facility]. Both wife and patient deny need for any post discharge services. Delta Burroughs RN informed. They did not want me to schedule PCP appt as wife said he has 2 appts scheduled next week and she will schedule herself. If meets criteria, patient agreeable to participate in care transition program post discharge. Encouraged patient to ask to speak with DCP on the unit should any needs arise. Follow Up  Future Appointments  Date Time Provider Talya Garcia   11/13/2018 10:15 AM Rani Patterson MD [facility] AND CHRISTIANO [facility]       Health Maintenance  There are no preventive care reminders to display for this patient.     Ibis Aguilar RN

## 2018-07-19 NOTE — PROGRESS NOTES
Hospitalist Progress Note      PCP: Sandoval Goldman MD    Date of Admission: 7/18/2018    Chief Complaint: Shortness of breath    Hospital Course:    81 y.o. male who presented to [facility] with acute onset of shortness of breath or hypoxia earlier this morning. Patient was doing fine until went to bed last night, he woke up those shaky and found hypoxic. Patient has a history of insomnia and was prescribed sleeping aid by primary care physician. After taking sleeping aid, he was very sleepy and lethargic. In ER workup patient was found hypoxic and hypercarbic, subsequently patient was started on BiPAP and admitted for further evaluation and management. Patient denies any fever, chest pain, abdominal pain, nausea or vomiting.       Subjective:   Patient is up in bed, comfortable, not in distress. Denies any chest pain or SOB. No new event overnight. Medications:  Reviewed    Infusion Medications   Scheduled Medications    apixaban  5 mg Oral BID    docusate sodium  100 mg Oral BID    fluticasone  1 spray Each Nare Daily    sertraline  100 mg Oral Daily    pravastatin  80 mg Oral Nightly    pantoprazole  40 mg Oral QAM AC    furosemide  40 mg Oral Daily    spironolactone  25 mg Oral Daily    ambrisentan  5 mg Oral Daily    sodium chloride flush  10 mL Intravenous 2 times per day    Tadalafil (PAH)  20 mg Oral Daily     PRN Meds: nitroGLYCERIN, sodium chloride flush, magnesium hydroxide, ondansetron      Intake/Output Summary (Last 24 hours) at 07/19/18 1208  Last data filed at 07/19/18 0509   Gross per 24 hour   Intake              130 ml   Output              525 ml   Net             -395 ml       Physical Exam Performed:    /66   Pulse 77   Temp 98.2 °F (36.8 °C) (Oral)   Resp 22   Ht 5' 9\" (1.753 m)   Wt 149 lb 9.6 oz (67.9 kg)   SpO2 91%   BMI 22.09 kg/m²     General appearance: No apparent distress, appears stated age and cooperative.   HEENT: Pupils equal, round, and reactive to light. Conjunctivae/corneas clear. Neck: Supple, with full range of motion. No jugular venous distention. Trachea midline. Respiratory:  Normal respiratory effort. Clear to auscultation, bilaterally without Rales/Wheezes/Rhonchi. Cardiovascular: Regular rate and rhythm with normal S1/S2 without murmurs, rubs or gallops. Abdomen: Soft, non-tender, non-distended with normal bowel sounds. Musculoskeletal: No clubbing, cyanosis or edema bilaterally. Full range of motion without deformity. Skin: Skin color, texture, turgor normal.  No rashes or lesions. Neurologic:  Neurovascularly intact without any focal sensory/motor deficits. Cranial nerves: II-XII intact, grossly non-focal.  Psychiatric: Alert and oriented, thought content appropriate, normal insight  Capillary Refill: Brisk,< 3 seconds   Peripheral Pulses: +2 palpable, equal bilaterally       Labs:   Recent Labs      07/18/18   0823 07/19/18   0504   WBC  12.4*  11.9*   HGB  11.1*  10.0*   HCT  38.9*  34.4*   PLT  467*  446     Recent Labs      07/18/18   0823  07/19/18   0504   NA  140  142   K  4.9  4.4   CL  100  101   CO2  33*  32   BUN  40*  34*   CREATININE  1.3  1.0   CALCIUM  8.7  8.3     Recent Labs      07/18/18   0823   AST  15   ALT  12   BILITOT  0.4   ALKPHOS  50     No results for input(s): INR in the last 72 hours. No results for input(s): Deward Feil in the last 72 hours. Urinalysis:    No results found for: Juancho Distance, BACTERIA, RBCUA, BLOODU, [address], [address]    Radiology:  XR CHEST PORTABLE   Final Result   Stable appearance of the chest.  Basilar atelectasis and volume loss. Small   right pleural effusion. No evidence of superimposed acute cardiopulmonary   disease.                  Assessment/Plan:    Active Hospital Problems    Diagnosis Date Noted    Acute respiratory failure with hypoxia and hypercapnia (HCC) [J96.01, J96.02] 07/18/2018     Priority: High    Atelectasis [J98.11] 07/18/2018    S/P

## 2018-07-19 NOTE — PLAN OF CARE
Problem: Falls - Risk of:  Goal: Will remain free from falls  Will remain free from falls   Outcome: Met This Shift  Pt is free of falls at this time. Bed is in the lowest position, wheels locked, side rails up x 2, call light, and personal belongings within reach. Will continue to monitor. Problem: Airway Clearance - Ineffective:  Goal: Ability to maintain a clear airway will improve  Ability to maintain a clear airway will improve   Outcome: Ongoing  Pt is on bipap or 11L HFNC.

## 2018-07-20 NOTE — DISCHARGE SUMMARY
regimen.  Further management as outpatient with Dr. Yesica Dacosta.     History of CAD, hemodynamically stable, continue current regimen, continue to monitor.     History of CA lung, history of lobectomy on right upper lobe, clinically stable, continue close follow-up as outpatient.     History of DVT, patient is on Eliquis, continue for now. Physical Exam Performed:     /65   Pulse 91   Temp 98.2 °F (36.8 °C) (Oral)   Resp 20   Ht 5' 9\" (1.753 m)   Wt 151 lb 10.8 oz (68.8 kg)   SpO2 90%   BMI 22.40 kg/m²       General appearance:  No apparent distress, appears stated age and cooperative. HEENT:  Normal cephalic, atraumatic without obvious deformity. Pupils equal, round, and reactive to light. Extra ocular muscles intact. Conjunctivae/corneas clear. Neck: Supple, with full range of motion. No jugular venous distention. Trachea midline. Respiratory:  Normal respiratory effort. Clear to auscultation, bilaterally without Rales/Wheezes/Rhonchi. Cardiovascular:  Regular rate and rhythm with normal S1/S2 without murmurs, rubs or gallops. Abdomen: Soft, non-tender, non-distended with normal bowel sounds. Musculoskeletal:  No clubbing, cyanosis or edema bilaterally. Full range of motion without deformity. Skin: Skin color, texture, turgor normal.  No rashes or lesions. Neurologic:  Neurovascularly intact without any focal sensory/motor deficits. Cranial nerves: II-XII intact, grossly non-focal.  Psychiatric:  Alert and oriented, thought content appropriate, normal insight  Capillary Refill: Brisk,< 3 seconds   Peripheral Pulses: +2 palpable, equal bilaterally       Labs:  For convenience and continuity at follow-up the following most recent labs are provided:      CBC:    Lab Results   Component Value Date    WBC 11.9 07/19/2018    HGB 10.0 07/19/2018    HCT 34.4 07/19/2018     07/19/2018       Renal:    Lab Results   Component Value Date     07/19/2018    K 4.4 07/19/2018     07/19/2018 CO2 32 07/19/2018    BUN 34 07/19/2018    CREATININE 1.0 07/19/2018    CALCIUM 8.3 07/19/2018         Significant Diagnostic Studies    Radiology:   XR CHEST PORTABLE   Final Result   Persistent bibasilar airspace disease with increased left-sided pleural   effusion         XR CHEST PORTABLE   Final Result   Stable appearance of the chest.  Basilar atelectasis and volume loss. Small   right pleural effusion. No evidence of superimposed acute cardiopulmonary   disease. Consults:     IP CONSULT TO HOSPITALIST  IP CONSULT TO PULMONOLOGY    Disposition:  home     Condition at Discharge: Stable    Discharge Instructions/Follow-up:  Cardiology as directed    Code Status:  Full Code     Activity: activity as tolerated    Diet: cardiac diet      Discharge Medications:     Current Discharge Medication List           Details   sertraline (ZOLOFT) 100 MG tablet TAKE 1 AND 1/2 TABLETS BY  MOUTH DAILY FOR ANXIETY  Qty: 135 tablet, Refills: 2      pravastatin (PRAVACHOL) 80 MG tablet TAKE 1 TABLET BY MOUTH  EVERY EVENING FOR HIGH  CHOLESTEROL  Qty: 90 tablet, Refills: 3      omeprazole (PRILOSEC) 20 MG delayed release capsule Take 20 mg by mouth daily      fluticasone (FLONASE) 50 MCG/ACT nasal spray 1 to 2 sprays each nostril daily  Qty: 1 Bottle, Refills: 1      apixaban (ELIQUIS) 5 MG TABS tablet 2 pills twice a day for 7 days then 1 pill b.i.d.  Qty: 60 tablet, Refills: 5      ambrisentan (LETAIRIS) 5 MG tablet Take 5 mg by mouth daily      Tadalafil, PAH, (ADCIRCA) 20 MG tablet Take 20 mg by mouth daily       docusate sodium (COLACE) 100 MG capsule Take 100 mg by mouth 2 times daily      furosemide (LASIX) 40 MG tablet Take 40 mg by mouth daily      spironolactone (ALDACTONE) 25 MG tablet Take 25 mg by mouth daily      azithromycin (ZITHROMAX) 250 MG tablet Take 2 tablets by mouth on Day 1, and 1 tablet by mouth daily on Days 2-5.   Qty: 6 tablet, Refills: 0      nitroGLYCERIN (NITROSTAT) 0.4 MG SL tablet Place 0.4 mg under the tongue every 5 minutes as needed for Chest pain             Time Spent on discharge is more than 30 minutes in the examination, evaluation, counseling and review of medications and discharge plan. Signed:    Chantell Tuttle MD   7/20/2018      Thank you Vasile Bardales MD for the opportunity to be involved in this patient's care. If you have any questions or concerns please feel free to contact me at [phone].

## 2018-07-20 NOTE — PROGRESS NOTES
loss.  Small right pleural effusion. No evidence of superimposed acute cardiopulmonary disease. Results for Aris Akers (MRN [id number]) as of 7/20/2018 10:31   Ref. Range 7/18/2018 08:37 7/19/2018 06:11 7/20/2018 07:20   Hemoglobin, Art, Extended Latest Ref Range: 13.5 - 17.5 g/dL 11.6 (L) 11.3 (L) 12.0 (L)   pH, Arterial Latest Ref Range: 7.350 - 7.450  7.247 (L) 7.319 (L) 7.380   pCO2, Arterial Latest Ref Range: 35.0 - 45.0 mmHg 80.1 (HH) 65.2 (H) 63.4 (H)   pO2, Arterial Latest Ref Range: 75.0 - 108.0 mmHg 28.9 (LL) 68.3 (L) 51.9 (L)   HCO3, Arterial Latest Ref Range: 21.0 - 29.0 mmol/L 34.1 (H) 32.8 (H) 36.7 (H)   TCO2 (calc), Art Latest Ref Range: Not Established mmol/L 36.6 34.8 38.6   Base Excess, Arterial Latest Ref Range: -3.0 - 3.0 mmol/L 4.7 (H) 5.1 (H) 9.5 (H)   O2 Sat, Arterial Latest Ref Range: >92 % 50.0 (L) 93.2 87.3 (L)   O2 Content, Arterial Latest Ref Range: Not Established mL/dL 8 15 15   Methemoglobin, Arterial Latest Ref Range: <1.5 % 0.8 0.6 0.5   Carboxyhgb, Arterial Latest Ref Range: 0.0 - 1.5 % 1.0 0.9 1.1         Assessment:  Principal Problem:    Acute respiratory failure with hypoxia and hypercapnia (HCC)  Active Problems:    Pulmonary hypertension    Malignant neoplasm of upper lobe of right lung (HCC)    Atelectasis    S/P lobectomy of lung    CO2 narcosis  Resolved Problems:    * No resolved hospital problems.  *          Plan:   · Oxygen supplementation to keep saturation between 90-94% only  ·  No need for any chronic BIPAP at this time   · Patient's CXR and ABG results were discussed with the patient and family   · It is possible that patient had increased hypercarbia because of sleeping aid he took last night-patient should avoid sedation or sleeping aids and patient has profound insomnia then patient can discuss with PCP about melatonin  · Patient may require a repeat PFT-and if patient has significant obstructive airway disease and patient may benefit from

## 2018-07-20 NOTE — PLAN OF CARE
Problem: Falls - Risk of:  Goal: Will remain free from falls  Will remain free from falls   Outcome: Met This Shift  Pt is free of falls at this time. Bed is in the lowest position, wheels locked, side rails up x 2, call light, and personal belongings within reach. Will continue to monitor.

## 2018-07-20 NOTE — PLAN OF CARE
Problem: Falls - Risk of:  Goal: Will remain free from falls  Will remain free from falls   Outcome: Ongoing  Pt A&O. Free of injury at this time. Makes needs known. Chair/bed alarm on at this time. Bed locked and in lowest position. Call light within reach. Will continue to monitor.

## 2018-07-21 NOTE — TELEPHONE ENCOUNTER
Phone call: Patient with recent hospital admission 7/18 until 7/2018 secondary to shortness of breath and acute respiratory failure probably linked to prescription for Xanax 1 mg as a sleeper prescribed by his cardiologist . Patient this morning notes discomfort about his feet bilaterally  about the top of the feet  in multiple toes. No swelling but some mild erythema. No open area. Discussed possibility of arthritic versus embolic. Of significance patient on Eliquis. Patient states this morning feet feel somewhat better. Wife had given him 1 Tylenol. Plan: Use Tylenol p.r.n. for pain. If toes become discolored and cool and more painful patient needs to go to the emergency room for further evaluation and treatment. Encouraged office visit. No more Xanax. All his information was relayed to the patient as well as his wife  . Dr. goddard.

## 2018-07-27 NOTE — CARE COORDINATION
[facility] Transitions Follow Up Call    2018    Patient: Gaye Dacosta  Patient : 1935   MRN: [id number]  Reason for Admission: Acute Respiratory Failure   Discharge Date: 18 RARS: Readmission Risk Score: 25       Spoke with: [address] [place] [address] Transitions Subsequent and Final Call    Subsequent and Final Calls  Have your medications changed?:  No  Do you have any questions related to your medications?:  No  Do you currently have any active services?:  No  Do you have any needs or concerns that I can assist you with?:  No  Identified Barriers:  None  Care Transitions Interventions     Other Services:  Declined (Comment: home care referral )   Other Interventions:        Spoke with patient's spouse who states patient is doing pretty good and that he went to therapy today and did the treadmill and did well. Patient continues to have swelling in feet but no longer having redness. Patient has apts with cardiologist on Monday. Per spouse no further needs at this time. Reminded patient that if they have any questions/concerns at anytime, they can always utilize CTC or call PCP/specialist as they have an MD on call .          Follow Up  Future Appointments  Date Time Provider Talya Garcia   2018 10:15 AM MD ARPITA Segura AND CHRISTIANO Krishna RN

## 2018-07-27 NOTE — CARE COORDINATION
[facility] Transitions Follow Up Call    2018    Patient: Teetee Lindsay  Patient : 1935   MRN: [id number]  Reason for Admission: Acute Respiratory Failure   Discharge Date: 18 RARS: Readmission Risk Score: 22       2nd attempt made to reach patient for transition call, no answer. VM left stating purpose of call along with my contact information requesting a return call.             Follow Up  Future Appointments  Date Time Provider Talya Garcia   2018 10:15 AM MD BETH Chew RN

## 2018-07-31 NOTE — CARE COORDINATION
[facility] Transitions Follow Up Call    2018    Patient: Manjula Azar  Patient : 1935   MRN: [id number]  Reason for Admission: Acute Respiratory Failure   Discharge Date: 18 RARS: Readmission Risk Score: 25       Spoke with: Issa Holliday patient's wife     Care Transitions Subsequent and Final Call    Subsequent and Final Calls  Have your medications changed?:  Yes  Do you have any questions related to your medications?:  No  Do you currently have any active services?:  No  Do you have any needs or concerns that I can assist you with?:  No  Identified Barriers:  None  Care Transitions Interventions     Other Services:  Declined (Comment: home care referral )   Other Interventions:        Spoke with Issa Holliday who states patient is doing much better. Patient had apt with Cardiologist and PHA MD yesterday and per Issa Holliday everything is looking good. Patient was told instructed to increase his prednisone until he sees pulmonologist and also to double his lasix for a couple of days d/t some slight swelling in his ankles. Per Issa Holliday patient denied any increasing SOB and feels strength is coming back. Issa Holliday stated they didn't need any further calls from CTC and feel things are going well. Reminded patient that if they have any questions/concerns at anytime, they can always utilize CTC or call PCP/specialist as they have an MD on call .        Follow Up  Future Appointments  Date Time Provider Talya Garcia   2018 10:15 AM MD ARPITA Suarez AND CHRISTIANO Mathews RN

## 2018-09-04 NOTE — PROGRESS NOTES
History of present illness:   Mr. Khari Doan  is a pleasant 81 y.o. male with a PMH of HTN, GERD, cancer, and anxiety who is here today with complaints of lower extremity weakness. He notes he developed left leg weakness 3 years ago after a myocardial infarction. He feels it has gradually progressed since then. He saw Dr. Fatemeh Cano 2 years ago and now wears a left AFO brace for left foot drop. He notes mild weakness in his right leg. He is not certain when his right foot weakness started, but he feels it is more recent than his left. He denies back or lower extremity pain or numbness. He denies saddle numbness or bowel or bladder dysfunction. He states he can sit and stand as long as he likes without pain. He is on O2. He does not believe his symptoms have worsened over the last year    General exam:  He is well-developed and well-nourished, is in obvious pain and alert and oriented to person, place, and time. He demonstrates appropriate mood and affect. His skin is warm and dry. His gait is normal and he walks heel to toe without limp or instability. Back:  He stands with slight lumbar flexion. His lumbar flexion, extension and lateral bending are moderately reduced with pain. He has mild tenderness over his lumbar spine without obvious muscle spasm. The skin over his lumbar spine is normal without a surgical scar. Lower extremities:  He has 1-2/5 left and right foot dorsiflexion, 3/5 strength bilateral ankle plantar flexion, otherwise 5/5 motor strength of bilateral lower extremities. He has a negative straight leg raise, bilaterally. Deep tendon reflexes at knees and achilles are 2+. Sensation is intact to light touch L3 to S1 bilaterally. He has no clonus. Hip range of motion painless. Abdomen:  Non-tender and non-distended. Abdomen is not obese. No rash. Imaging:   Ap and lateral xray images of his lumbar spine, a previous office visit, show severe multilevel spondylosis and facet arthropathy, most pronounced at L4-5, and possible DISH. I reviewed MRI images of his lumbar spine the office today. They show moderate central stenosis L4-L5 with severe right foraminal stenosis L4-L5 and mild to moderate left foraminal stenosis at that level. He has moderate to severe bilateral foraminal stenosis L5-S.    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 I told him I thought surgery is unlikely to help. His pulmonologist apparently opined that he should not undergo surgery. He will consider seeing Dr. Paul Loo to discuss epidural injections.

## 2018-09-26 NOTE — PROGRESS NOTES
New Patient: SPINE    CHIEF COMPLAINT:    Chief Complaint   Patient presents with    Back Pain     BILATERAL LEG WEAKNESS - XR, MRI - REF DR. FRANCO FOR EPIRUAL       HISTORY OF PRESENT ILLNESS:                The patient is a 81 y.o. male who I last saw 4 years ago. He has lumbar stenosis and leg weakness as well as peripheral neuropathy. He feels over the last year after an MRI that is leg weakness is worse. He has no pain. Recent evaluation with Dr. Daryl Galdamez suggested epidural injections Dr. Debra Alexandre evaluation is that surgery is unlikely to help per the record        Past Medical History:   Diagnosis Date    Anxiety     Benign head tremor     CAD (coronary artery disease)      9/2015 acute inferior myocardial infarction with PCI of RCA but apparently no stent placed. Cardiogenic shock.  Cancer ([address]     lung    DVT (deep venous thrombosis) ([facility] 04/13/2017    Rt LE DVT    Elevated PSA     GERD (gastroesophageal reflux disease)  2/19/2016    EGD: Reflux esophagitis, dilatation.  Hyperlipidemia     Hypertension     Insomnia     Myocardial infarct ([facility] 9/13/15    no stents    Prostate CA [facility]) 2004    Prostatectomy    Pulmonary hypertension (HCC)     Severe precapillary pulmonary HTN: Dr. Jorje Winslow Spinal stenosis                The pain assessment was noted & reviewed in the medical record today. Current/Past Treatment:   · Physical Therapy:   · Chiropractic:     · Injection:     Medications:  · Current:  · Past:  · Surgery/Consult:    Work Status/Functionality:     Past Medical History: Medical history form was reviewed today & scanned into the media tab  Past Medical History:   Diagnosis Date    Anxiety     Benign head tremor     CAD (coronary artery disease)      9/2015 acute inferior myocardial infarction with PCI of RCA but apparently no stent placed. Cardiogenic shock.     Cancer ([address]     lung    DVT (deep venous thrombosis) ([facility] 04/13/2017    Rt LE DVT    Elevated PSA to look for reversible causes of his neuropathy         ALYSHA Rogel

## 2018-10-18 PROBLEM — N18.30 STAGE 3 CHRONIC KIDNEY DISEASE (HCC): Status: ACTIVE | Noted: 2018-01-01

## 2018-10-18 NOTE — PROGRESS NOTES
nontender to touch. Eyes: EOMI, PERRLA without nystagnus. Spine/extremities: +2 ankle and anterior tibial edema bilaterally. Skin:  Erythema about inner aspect of left forearm. On the dorsal surface close to the elbow scabbed areas which are not erythematous and have no active drainage. .  CNS:Patient's alert, cooperative, moves all 4 limbs, ambulates  slowly. Left lower extremity brace for foot drop., no slurred speech, no facial droop, good orientation. Good historian. Blood pressure (!) 126/52, pulse 102, weight 149 lb (67.6 kg), SpO2 (!) 82 %. Testing: Urinalysis: Glucose 2 WBC +3 RBC. Assessment:      1.  CAD: Lipids stable  2. CKD: Elevated creatinine  3. Spinal stenosis: Baseline  4. Left forearm cellulitis  5. Leg weakness worse after restarting Pravachol  6. Anemia: Has improved  7. UTI         Plan:      1. Influenza vaccine given today after discussing risk and benefits  2. Continue with Keflex for cellulitis and UTI  3. Decrease Lasix take 1/2 pill Monday Wednesday Friday 1 pill all other days  4. Repeat BMP lab test in several weeks and call next day for results  5. Stay off Pravachol  6. Start coenzyme Q10 100 mg once a day over-the-counter  7. Follow-up with neurology concerning leg weakness  8.   Repeat urinalysis in 3-4 weeks  Return follow-up             Merline Leber, MD

## 2018-10-18 NOTE — TELEPHONE ENCOUNTER
Call patient: Urinalysis today did show infection. Patient instructed to continue and prescription given for Keflex which   is treating left arm cellulitis.   Should repeat urinalysis in 3 weeks to see if clears  Dr. day

## 2018-11-01 PROBLEM — E11.42 TYPE 2 DIABETES MELLITUS WITH DIABETIC POLYNEUROPATHY, WITHOUT LONG-TERM CURRENT USE OF INSULIN (HCC): Status: ACTIVE | Noted: 2018-01-01

## 2018-11-01 PROBLEM — G62.9 POLYNEUROPATHY: Status: ACTIVE | Noted: 2018-01-01

## 2018-11-01 NOTE — PROGRESS NOTES
Right 2007    OTHER SURGICAL HISTORY      [place]    UPPER GASTROINTESTINAL ENDOSCOPY  2/19/2016    Esophageal Dilatation, reflux esophagitis,sm HH. Negative Bravo's. Negative H. pylori         Exam:   Constitutional:   Vitals:    11/01/18 0937   BP: 136/66   Pulse: 96   Weight: 161 lb (73 kg)   Height: 5' 9\" (1.753 m)       General appearance: well-nourished. Eye: No icterus. No blurring of optic disc. Neck: supple  Cardiovascular: No carotid bruit. Heart: S1, S2         No lower leg edema with good pulsation. Mental Status: Oriented to person, place, problem, and time. Fluent speech. Good fund of knowledge. Normal attention span and concentration. Cranial Nerves:   II: Visual fields: Left homonymous hemianopsia  III: Pupils: equal, round, reactive to light  III,IV,VI: Extra Ocular Movements are intact. No nystagmus  V: Facial sensation is intact to pin prick and light touch  VII: Facial strength and movements: intact and symmetric  VIII: Hearing: Intact to finger rub bilaterally  IX: Palate elevation is symmetric  XI: Shoulder shrug is intact  XII: Tongue movements are normal  Musculoskeletal: No weakness in his arms. Bilateral foot drop more left than right 1/5. Mild weakness proximally 4 +/5. Reflexes: 2 plus in his arms and diminished in his legs. Planters: flexor bilaterally. Coordination: no pronator drift, no dysmetria with FNF testing. No tremors. Sensation: normal to all modalities in his arms and decreased distally to vibration and touch in his knees and ankles. .  Gait/Posture: High steppage gait with his cane.     ROS : A 10-12 system review of constitutional, cardiovascular, respiratory, musculoskeletal, endocrine, hematological, skin, SHEENT, genitourinary, psychiatric and neurologic systems was obtained and updated today which is unremarkable except as mentioned in my HPI      Medical decision making:  I personally reviewed and updated social history, past

## 2019-01-01 ENCOUNTER — OFFICE VISIT (OUTPATIENT)
Dept: INTERNAL MEDICINE CLINIC | Age: 84
End: 2019-01-01
Payer: MEDICARE

## 2019-01-01 ENCOUNTER — HOSPITAL ENCOUNTER (OUTPATIENT)
Age: 84
Discharge: HOME | End: 2019-02-13

## 2019-01-01 ENCOUNTER — HOSPITAL ENCOUNTER (OUTPATIENT)
Age: 84
Setting detail: OUTPATIENT SURGERY
Discharge: HOME | End: 2019-02-11
Attending: UROLOGY | Admitting: UROLOGY
Payer: MEDICARE

## 2019-01-01 ENCOUNTER — ANESTHESIA (OUTPATIENT)
Dept: OPERATING ROOM | Age: 84
End: 2019-01-01
Payer: MEDICARE

## 2019-01-01 ENCOUNTER — HOSPITAL ENCOUNTER (OUTPATIENT)
Age: 84
Discharge: HOME | End: 2019-01-08

## 2019-01-01 ENCOUNTER — HOSPITAL ENCOUNTER (OUTPATIENT)
Age: 84
Discharge: HOME | End: 2019-03-15

## 2019-01-01 ENCOUNTER — ANESTHESIA EVENT (OUTPATIENT)
Dept: OPERATING ROOM | Age: 84
End: 2019-01-01
Payer: MEDICARE

## 2019-01-01 VITALS
BODY MASS INDEX: 22.19 KG/M2 | WEIGHT: 155 LBS | TEMPERATURE: 98 F | HEIGHT: 70 IN | SYSTOLIC BLOOD PRESSURE: 120 MMHG | DIASTOLIC BLOOD PRESSURE: 56 MMHG

## 2019-01-01 VITALS
DIASTOLIC BLOOD PRESSURE: 72 MMHG | WEIGHT: 155 LBS | BODY MASS INDEX: 22.24 KG/M2 | HEART RATE: 80 BPM | SYSTOLIC BLOOD PRESSURE: 128 MMHG

## 2019-01-01 VITALS — SYSTOLIC BLOOD PRESSURE: 124 MMHG | DIASTOLIC BLOOD PRESSURE: 73 MMHG | OXYGEN SATURATION: 96 %

## 2019-01-01 VITALS
SYSTOLIC BLOOD PRESSURE: 118 MMHG | WEIGHT: 150 LBS | DIASTOLIC BLOOD PRESSURE: 59 MMHG | BODY MASS INDEX: 21.47 KG/M2 | TEMPERATURE: 98.2 F | HEART RATE: 89 BPM | OXYGEN SATURATION: 92 % | HEIGHT: 70 IN | RESPIRATION RATE: 26 BRPM

## 2019-01-01 DIAGNOSIS — H61.22 IMPACTED CERUMEN, LEFT EAR: Primary | ICD-10-CM

## 2019-01-01 DIAGNOSIS — C67.8 MALIGNANT NEOPLASM OF OVERLAPPING SITES OF BLADDER (HCC): Primary | ICD-10-CM

## 2019-01-01 DIAGNOSIS — Z01.818 PRE-OP EXAM: Primary | ICD-10-CM

## 2019-01-01 DIAGNOSIS — C67.9 MALIGNANT NEOPLASM OF URINARY BLADDER, UNSPECIFIED SITE (HCC): ICD-10-CM

## 2019-01-01 LAB
APTT: 34.2 SEC (ref 26–36)
INR BLD: 1.18 (ref 0.86–1.14)
PROTHROMBIN TIME: 13.4 SEC (ref 9.8–13)

## 2019-01-01 PROCEDURE — G8427 DOCREV CUR MEDS BY ELIG CLIN: HCPCS | Performed by: NURSE PRACTITIONER

## 2019-01-01 PROCEDURE — 3600000014 HC SURGERY LEVEL 4 ADDTL 15MIN: Performed by: UROLOGY

## 2019-01-01 PROCEDURE — 9900000038 HC CARDIAC REHAB PHASE 3 - MONTHLY

## 2019-01-01 PROCEDURE — 3600000004 HC SURGERY LEVEL 4 BASE: Performed by: UROLOGY

## 2019-01-01 PROCEDURE — 3700000001 HC ADD 15 MINUTES (ANESTHESIA): Performed by: UROLOGY

## 2019-01-01 PROCEDURE — G8420 CALC BMI NORM PARAMETERS: HCPCS | Performed by: NURSE PRACTITIONER

## 2019-01-01 PROCEDURE — 93000 ELECTROCARDIOGRAM COMPLETE: CPT | Performed by: NURSE PRACTITIONER

## 2019-01-01 PROCEDURE — 6360000002 HC RX W HCPCS: Performed by: NURSE ANESTHETIST, CERTIFIED REGISTERED

## 2019-01-01 PROCEDURE — G8482 FLU IMMUNIZE ORDER/ADMIN: HCPCS | Performed by: NURSE PRACTITIONER

## 2019-01-01 PROCEDURE — 1036F TOBACCO NON-USER: CPT | Performed by: NURSE PRACTITIONER

## 2019-01-01 PROCEDURE — 2709999900 HC NON-CHARGEABLE SUPPLY: Performed by: UROLOGY

## 2019-01-01 PROCEDURE — 7100000000 HC PACU RECOVERY - FIRST 15 MIN: Performed by: UROLOGY

## 2019-01-01 PROCEDURE — 85610 PROTHROMBIN TIME: CPT

## 2019-01-01 PROCEDURE — 6370000000 HC RX 637 (ALT 250 FOR IP): Performed by: UROLOGY

## 2019-01-01 PROCEDURE — 2500000003 HC RX 250 WO HCPCS: Performed by: NURSE ANESTHETIST, CERTIFIED REGISTERED

## 2019-01-01 PROCEDURE — 1123F ACP DISCUSS/DSCN MKR DOCD: CPT | Performed by: NURSE PRACTITIONER

## 2019-01-01 PROCEDURE — 88307 TISSUE EXAM BY PATHOLOGIST: CPT

## 2019-01-01 PROCEDURE — 2580000003 HC RX 258: Performed by: ANESTHESIOLOGY

## 2019-01-01 PROCEDURE — 6360000002 HC RX W HCPCS: Performed by: UROLOGY

## 2019-01-01 PROCEDURE — 99214 OFFICE O/P EST MOD 30 MIN: CPT | Performed by: NURSE PRACTITIONER

## 2019-01-01 PROCEDURE — 69209 REMOVE IMPACTED EAR WAX UNI: CPT | Performed by: NURSE PRACTITIONER

## 2019-01-01 PROCEDURE — 85730 THROMBOPLASTIN TIME PARTIAL: CPT

## 2019-01-01 PROCEDURE — 7100000010 HC PHASE II RECOVERY - FIRST 15 MIN: Performed by: UROLOGY

## 2019-01-01 PROCEDURE — 1101F PT FALLS ASSESS-DOCD LE1/YR: CPT | Performed by: NURSE PRACTITIONER

## 2019-01-01 PROCEDURE — 4040F PNEUMOC VAC/ADMIN/RCVD: CPT | Performed by: NURSE PRACTITIONER

## 2019-01-01 PROCEDURE — 36415 COLL VENOUS BLD VENIPUNCTURE: CPT

## 2019-01-01 PROCEDURE — 2500000003 HC RX 250 WO HCPCS: Performed by: ANESTHESIOLOGY

## 2019-01-01 PROCEDURE — 7100000011 HC PHASE II RECOVERY - ADDTL 15 MIN: Performed by: UROLOGY

## 2019-01-01 PROCEDURE — 3700000000 HC ANESTHESIA ATTENDED CARE: Performed by: UROLOGY

## 2019-01-01 PROCEDURE — 7100000001 HC PACU RECOVERY - ADDTL 15 MIN: Performed by: UROLOGY

## 2019-01-01 PROCEDURE — G8598 ASA/ANTIPLAT THER USED: HCPCS | Performed by: NURSE PRACTITIONER

## 2019-01-01 RX ORDER — SODIUM CHLORIDE, SODIUM LACTATE, POTASSIUM CHLORIDE, CALCIUM CHLORIDE 600; 310; 30; 20 MG/100ML; MG/100ML; MG/100ML; MG/100ML
INJECTION, SOLUTION INTRAVENOUS CONTINUOUS
Status: DISCONTINUED | OUTPATIENT
Start: 2019-01-01 | End: 2019-01-01 | Stop reason: HOSPADM

## 2019-01-01 RX ORDER — PHENAZOPYRIDINE HYDROCHLORIDE 200 MG/1
200 TABLET, FILM COATED ORAL 3 TIMES DAILY PRN
Qty: 15 TABLET | Refills: 0 | Status: SHIPPED | OUTPATIENT
Start: 2019-01-01 | End: 2019-01-01

## 2019-01-01 RX ORDER — PREDNISONE 10 MG/1
10 TABLET ORAL DAILY
COMMUNITY

## 2019-01-01 RX ORDER — CEPHALEXIN 250 MG/1
250 CAPSULE ORAL 3 TIMES DAILY
Qty: 12 CAPSULE | Refills: 0 | Status: SHIPPED | OUTPATIENT
Start: 2019-01-01 | End: 2019-01-01

## 2019-01-01 RX ORDER — LIDOCAINE HYDROCHLORIDE 20 MG/ML
INJECTION, SOLUTION INFILTRATION; PERINEURAL PRN
Status: DISCONTINUED | OUTPATIENT
Start: 2019-01-01 | End: 2019-01-01 | Stop reason: SDUPTHER

## 2019-01-01 RX ORDER — DIMETHICONE, OXYBENZONE, AND PADIMATE O 2; 2.5; 6.6 G/100G; G/100G; G/100G
STICK TOPICAL
Status: DISCONTINUED
Start: 2019-01-01 | End: 2019-01-01 | Stop reason: HOSPADM

## 2019-01-01 RX ORDER — OXYCODONE HYDROCHLORIDE AND ACETAMINOPHEN 5; 325 MG/1; MG/1
0.5 TABLET ORAL EVERY 4 HOURS PRN
Qty: 10 TABLET | Refills: 0 | Status: SHIPPED | OUTPATIENT
Start: 2019-01-01 | End: 2019-01-01

## 2019-01-01 RX ORDER — ETOMIDATE 2 MG/ML
INJECTION INTRAVENOUS PRN
Status: DISCONTINUED | OUTPATIENT
Start: 2019-01-01 | End: 2019-01-01 | Stop reason: SDUPTHER

## 2019-01-01 RX ORDER — FENTANYL CITRATE 50 UG/ML
INJECTION, SOLUTION INTRAMUSCULAR; INTRAVENOUS PRN
Status: DISCONTINUED | OUTPATIENT
Start: 2019-01-01 | End: 2019-01-01 | Stop reason: SDUPTHER

## 2019-01-01 RX ADMIN — LIDOCAINE HYDROCHLORIDE 60 MG: 20 INJECTION, SOLUTION INFILTRATION; PERINEURAL at 14:15

## 2019-01-01 RX ADMIN — LIDOCAINE HYDROCHLORIDE 0.3 ML: 10 INJECTION, SOLUTION EPIDURAL; INFILTRATION; INTRACAUDAL; PERINEURAL at 13:15

## 2019-01-01 RX ADMIN — SODIUM CHLORIDE, POTASSIUM CHLORIDE, SODIUM LACTATE AND CALCIUM CHLORIDE: 600; 310; 30; 20 INJECTION, SOLUTION INTRAVENOUS at 13:15

## 2019-01-01 RX ADMIN — SODIUM CHLORIDE, POTASSIUM CHLORIDE, SODIUM LACTATE AND CALCIUM CHLORIDE: 600; 310; 30; 20 INJECTION, SOLUTION INTRAVENOUS at 14:09

## 2019-01-01 RX ADMIN — PHENYLEPHRINE HYDROCHLORIDE 100 MCG: 10 INJECTION INTRAVENOUS at 14:15

## 2019-01-01 RX ADMIN — PHENYLEPHRINE HYDROCHLORIDE 150 MCG: 10 INJECTION INTRAVENOUS at 14:31

## 2019-01-01 RX ADMIN — Medication 2 G: at 14:00

## 2019-01-01 RX ADMIN — ETOMIDATE 20 MG: 2 INJECTION, SOLUTION INTRAVENOUS at 14:15

## 2019-01-01 RX ADMIN — FENTANYL CITRATE 50 MCG: 50 INJECTION INTRAMUSCULAR; INTRAVENOUS at 14:15

## 2019-01-01 RX ADMIN — PHENYLEPHRINE HYDROCHLORIDE 150 MCG: 10 INJECTION INTRAVENOUS at 14:37

## 2019-01-01 RX ADMIN — PHENYLEPHRINE HYDROCHLORIDE 100 MCG: 10 INJECTION INTRAVENOUS at 14:43

## 2019-01-01 ASSESSMENT — PULMONARY FUNCTION TESTS
PIF_VALUE: 3
PIF_VALUE: 0
PIF_VALUE: 1
PIF_VALUE: 15
PIF_VALUE: 1
PIF_VALUE: 15
PIF_VALUE: 18
PIF_VALUE: 1
PIF_VALUE: 2
PIF_VALUE: 0
PIF_VALUE: 15
PIF_VALUE: 16
PIF_VALUE: 14
PIF_VALUE: 14
PIF_VALUE: 1
PIF_VALUE: 0
PIF_VALUE: 16
PIF_VALUE: 1
PIF_VALUE: 17
PIF_VALUE: 16
PIF_VALUE: 16
PIF_VALUE: 15
PIF_VALUE: 6
PIF_VALUE: 15
PIF_VALUE: 15
PIF_VALUE: 1
PIF_VALUE: 1
PIF_VALUE: 0
PIF_VALUE: 15
PIF_VALUE: 3
PIF_VALUE: 16
PIF_VALUE: 1
PIF_VALUE: 2
PIF_VALUE: 4
PIF_VALUE: 0
PIF_VALUE: 3
PIF_VALUE: 14
PIF_VALUE: 15
PIF_VALUE: 18
PIF_VALUE: 1
PIF_VALUE: 15
PIF_VALUE: 15
PIF_VALUE: 13
PIF_VALUE: 0
PIF_VALUE: 22
PIF_VALUE: 16
PIF_VALUE: 14
PIF_VALUE: 3
PIF_VALUE: 15
PIF_VALUE: 2
PIF_VALUE: 0
PIF_VALUE: 17
PIF_VALUE: 1
PIF_VALUE: 4

## 2023-08-09 NOTE — PLAN OF CARE
Problem: Fluid Volume - Imbalance:  Goal: Absence of imbalanced fluid volume signs and symptoms  Absence of imbalanced fluid volume signs and symptoms  Outcome: Ongoing Purse String (Intermediate) Text: Given the location of the defect and the characteristics of the surrounding skin a purse string intermediate closure was deemed most appropriate.  Undermining was performed circumferentially around the surgical defect.  A purse string suture was then placed and tightened.

## (undated) DEVICE — MEDI-VAC NON-CONDUCTIVE SUCTION TUBING: Brand: CARDINAL HEALTH

## (undated) DEVICE — TRAY,CATHETER,URETHRAL,VINYL,PVP,14FR: Brand: MEDLINE

## (undated) DEVICE — Z INACTIVE USE 2635504 SOLUTION IRRIG 3000ML 1.5% GL USP UROMATIC CONT

## (undated) DEVICE — ELECTRODE LOOP 24FR R ANG MPLR CUT

## (undated) DEVICE — PREP SOL PVP IODINE 4%  4 OZ/BTL

## (undated) DEVICE — CATHETER URETH 22FR BLLN 30CC 3 W F SPEC INF CTRL BARDX

## (undated) DEVICE — Z CONVERTED USE 2271043 CONTAINER SPEC COLL 4OZ SCR ON LID PEEL PCH

## (undated) DEVICE — DBD-PACK,CYSTOSCOPY,PK VI,AURORA: Brand: MEDLINE

## (undated) DEVICE — SOLUTION IV IRRIG WATER 1000ML POUR BRL 2F7114

## (undated) DEVICE — GLOVE ORANGE PI 7 1/2   MSG9075

## (undated) DEVICE — BOWL MED L 32OZ PLAS W/ MOLD GRAD EZ OPN PEEL PCH

## (undated) DEVICE — CIRCUIT ANES L72IN 3L BACT AND VIR FLTR EL CONN SGL LIMB

## (undated) DEVICE — SET IRRIG L94IN DIA0.281IN L BOR W/ 2 N VENT SPIK 2 ON/OFF

## (undated) DEVICE — GAUZE,SPONGE,4"X4",8PLY,STRL,LF,10/TRAY: Brand: MEDLINE

## (undated) DEVICE — BAG DRNGE 2000ML UROLOGY ANTI REFLX CHMBR SAMP PRT

## (undated) DEVICE — SYRINGE 60ML IRRIG PISTON TOMEY

## (undated) DEVICE — ELECTRODE PT RET AD L9FT HI MOIST COND ADH HYDRGEL CORDED

## (undated) DEVICE — GOWN SIRUS NONREIN LG W/TWL: Brand: MEDLINE INDUSTRIES, INC.

## (undated) DEVICE — BAG URIN STRL FOR URO CTCH SYS

## (undated) DEVICE — CABLE ENDOSCP L10FT ACT DISP